# Patient Record
Sex: FEMALE | Race: BLACK OR AFRICAN AMERICAN | NOT HISPANIC OR LATINO | Employment: UNEMPLOYED | URBAN - METROPOLITAN AREA
[De-identification: names, ages, dates, MRNs, and addresses within clinical notes are randomized per-mention and may not be internally consistent; named-entity substitution may affect disease eponyms.]

---

## 2017-02-08 ENCOUNTER — HISTORICAL (OUTPATIENT)
Dept: ANESTHESIOLOGY | Facility: HOSPITAL | Age: 52
End: 2017-02-08

## 2017-10-19 ENCOUNTER — HISTORICAL (OUTPATIENT)
Dept: INTERNAL MEDICINE | Facility: CLINIC | Age: 52
End: 2017-10-19

## 2018-01-09 ENCOUNTER — HISTORICAL (OUTPATIENT)
Dept: WOUND CARE | Facility: HOSPITAL | Age: 53
End: 2018-01-09

## 2018-05-15 ENCOUNTER — HISTORICAL (OUTPATIENT)
Dept: INTERNAL MEDICINE | Facility: CLINIC | Age: 53
End: 2018-05-15

## 2019-01-21 ENCOUNTER — HISTORICAL (OUTPATIENT)
Dept: RADIOLOGY | Facility: HOSPITAL | Age: 54
End: 2019-01-21

## 2019-09-16 ENCOUNTER — HISTORICAL (OUTPATIENT)
Dept: ADMINISTRATIVE | Facility: HOSPITAL | Age: 54
End: 2019-09-16

## 2019-09-16 LAB
ABS NEUT (OLG): 5.11 X10(3)/MCL (ref 2.1–9.2)
ALBUMIN SERPL-MCNC: 4.3 GM/DL (ref 3.4–5)
ALBUMIN/GLOB SERPL: 1.1 RATIO (ref 1.1–2)
ALP SERPL-CCNC: 103 UNIT/L (ref 45–117)
ALT SERPL-CCNC: 20 UNIT/L (ref 12–78)
AST SERPL-CCNC: 13 UNIT/L (ref 15–37)
BASOPHILS # BLD AUTO: 0 X10(3)/MCL (ref 0–0.2)
BASOPHILS NFR BLD AUTO: 0 %
BILIRUB SERPL-MCNC: 0.4 MG/DL (ref 0.2–1)
BILIRUBIN DIRECT+TOT PNL SERPL-MCNC: 0.1 MG/DL (ref 0–0.2)
BILIRUBIN DIRECT+TOT PNL SERPL-MCNC: 0.3 MG/DL
BUN SERPL-MCNC: 10 MG/DL (ref 7–18)
CALCIUM SERPL-MCNC: 9.4 MG/DL (ref 8.5–10.1)
CHLORIDE SERPL-SCNC: 107 MMOL/L (ref 98–107)
CHOLEST SERPL-MCNC: 223 MG/DL
CHOLEST/HDLC SERPL: 3.9 {RATIO} (ref 0–4.4)
CO2 SERPL-SCNC: 31 MMOL/L (ref 21–32)
CREAT SERPL-MCNC: 0.8 MG/DL (ref 0.6–1.3)
EOSINOPHIL # BLD AUTO: 0.2 X10(3)/MCL (ref 0–0.9)
EOSINOPHIL NFR BLD AUTO: 2 %
ERYTHROCYTE [DISTWIDTH] IN BLOOD BY AUTOMATED COUNT: 13.5 % (ref 11.5–14.5)
EST. AVERAGE GLUCOSE BLD GHB EST-MCNC: 108 MG/DL
GLOBULIN SER-MCNC: 3.8 GM/ML (ref 2.3–3.5)
GLUCOSE SERPL-MCNC: 108 MG/DL (ref 74–106)
HBA1C MFR BLD: 5.4 % (ref 4.2–6.3)
HCT VFR BLD AUTO: 39.3 % (ref 35–46)
HDLC SERPL-MCNC: 57 MG/DL (ref 40–59)
HGB BLD-MCNC: 13.7 GM/DL (ref 12–16)
IMM GRANULOCYTES # BLD AUTO: 0.02 10*3/UL
IMM GRANULOCYTES NFR BLD AUTO: 0 %
LDLC SERPL CALC-MCNC: 148 MG/DL
LYMPHOCYTES # BLD AUTO: 2.7 X10(3)/MCL (ref 0.6–4.6)
LYMPHOCYTES NFR BLD AUTO: 31 %
MCH RBC QN AUTO: 28.2 PG (ref 26–34)
MCHC RBC AUTO-ENTMCNC: 34.9 GM/DL (ref 31–37)
MCV RBC AUTO: 81 FL (ref 80–100)
MONOCYTES # BLD AUTO: 0.6 X10(3)/MCL (ref 0.1–1.3)
MONOCYTES NFR BLD AUTO: 7 %
NEUTROPHILS # BLD AUTO: 5.11 X10(3)/MCL (ref 2.1–9.2)
NEUTROPHILS NFR BLD AUTO: 59 %
PLATELET # BLD AUTO: 216 X10(3)/MCL (ref 130–400)
PMV BLD AUTO: 10.7 FL (ref 7.4–10.4)
POTASSIUM SERPL-SCNC: 3.4 MMOL/L (ref 3.5–5.1)
PROT SERPL-MCNC: 8.1 GM/DL (ref 6.4–8.2)
RBC # BLD AUTO: 4.85 X10(6)/MCL (ref 4–5.2)
SODIUM SERPL-SCNC: 142 MMOL/L (ref 136–145)
T4 FREE SERPL-MCNC: 1.18 NG/DL (ref 0.76–1.46)
TRIGL SERPL-MCNC: 88 MG/DL
TSH SERPL-ACNC: 4.91 MIU/L (ref 0.36–3.74)
VLDLC SERPL CALC-MCNC: 18 MG/DL
WBC # SPEC AUTO: 8.7 X10(3)/MCL (ref 4.5–11)

## 2020-01-08 LAB
HPV16+18+H RISK 12 DNA CVX-IMP: NEGATIVE
PAP RECOMMENDATION EXT: NORMAL
PAP SMEAR: NORMAL

## 2020-02-03 ENCOUNTER — HISTORICAL (OUTPATIENT)
Dept: RADIOLOGY | Facility: HOSPITAL | Age: 55
End: 2020-02-03

## 2020-11-17 ENCOUNTER — HISTORICAL (OUTPATIENT)
Dept: ADMINISTRATIVE | Facility: HOSPITAL | Age: 55
End: 2020-11-17

## 2020-11-19 ENCOUNTER — HISTORICAL (OUTPATIENT)
Dept: SURGERY | Facility: HOSPITAL | Age: 55
End: 2020-11-19

## 2020-11-23 ENCOUNTER — HISTORICAL (OUTPATIENT)
Dept: INTERNAL MEDICINE | Facility: CLINIC | Age: 55
End: 2020-11-23

## 2020-11-23 LAB
ABS NEUT (OLG): 5.32 X10(3)/MCL (ref 2.1–9.2)
ALBUMIN SERPL-MCNC: 4.4 GM/DL (ref 3.5–5)
ALBUMIN/GLOB SERPL: 1.3 RATIO (ref 1.1–2)
ALP SERPL-CCNC: 85 UNIT/L (ref 40–150)
ALT SERPL-CCNC: 14 UNIT/L (ref 0–55)
AST SERPL-CCNC: 27 UNIT/L (ref 5–34)
BASOPHILS # BLD AUTO: 0 X10(3)/MCL (ref 0–0.2)
BASOPHILS NFR BLD AUTO: 0 %
BILIRUB SERPL-MCNC: 0.3 MG/DL
BILIRUBIN DIRECT+TOT PNL SERPL-MCNC: 0.1 MG/DL (ref 0–0.5)
BILIRUBIN DIRECT+TOT PNL SERPL-MCNC: 0.2 MG/DL (ref 0–0.8)
BUN SERPL-MCNC: 10 MG/DL (ref 9.8–20.1)
CALCIUM SERPL-MCNC: 9.7 MG/DL (ref 8.4–10.2)
CHLORIDE SERPL-SCNC: 105 MMOL/L (ref 98–107)
CHOLEST SERPL-MCNC: 187 MG/DL
CHOLEST/HDLC SERPL: 4 {RATIO} (ref 0–5)
CO2 SERPL-SCNC: 30 MMOL/L (ref 22–29)
CREAT SERPL-MCNC: 0.73 MG/DL (ref 0.55–1.02)
EOSINOPHIL # BLD AUTO: 0.2 X10(3)/MCL (ref 0–0.9)
EOSINOPHIL NFR BLD AUTO: 2 %
ERYTHROCYTE [DISTWIDTH] IN BLOOD BY AUTOMATED COUNT: 13.9 % (ref 11.5–14.5)
EST. AVERAGE GLUCOSE BLD GHB EST-MCNC: 96.8 MG/DL
GLOBULIN SER-MCNC: 3.3 GM/DL (ref 2.4–3.5)
GLUCOSE SERPL-MCNC: 89 MG/DL (ref 74–100)
HBA1C MFR BLD: 5 %
HCT VFR BLD AUTO: 38.5 % (ref 35–46)
HDLC SERPL-MCNC: 48 MG/DL (ref 35–60)
HGB BLD-MCNC: 13.5 GM/DL (ref 12–16)
IMM GRANULOCYTES # BLD AUTO: 0.02 10*3/UL
IMM GRANULOCYTES NFR BLD AUTO: 0 %
LDLC SERPL CALC-MCNC: 103 MG/DL (ref 50–140)
LYMPHOCYTES # BLD AUTO: 2.9 X10(3)/MCL (ref 0.6–4.6)
LYMPHOCYTES NFR BLD AUTO: 32 %
MCH RBC QN AUTO: 28.7 PG (ref 26–34)
MCHC RBC AUTO-ENTMCNC: 35.1 GM/DL (ref 31–37)
MCV RBC AUTO: 81.7 FL (ref 80–100)
MONOCYTES # BLD AUTO: 0.6 X10(3)/MCL (ref 0.1–1.3)
MONOCYTES NFR BLD AUTO: 7 %
NEUTROPHILS # BLD AUTO: 5.32 X10(3)/MCL (ref 2.1–9.2)
NEUTROPHILS NFR BLD AUTO: 59 %
PLATELET # BLD AUTO: 219 X10(3)/MCL (ref 130–400)
PMV BLD AUTO: 11.3 FL (ref 7.4–10.4)
POTASSIUM SERPL-SCNC: 3.4 MMOL/L (ref 3.5–5.1)
PROT SERPL-MCNC: 7.7 GM/DL (ref 6.4–8.3)
RBC # BLD AUTO: 4.71 X10(6)/MCL (ref 4–5.2)
SODIUM SERPL-SCNC: 145 MMOL/L (ref 136–145)
T4 FREE SERPL-MCNC: 1.03 NG/DL (ref 0.7–1.48)
TRIGL SERPL-MCNC: 180 MG/DL (ref 37–140)
TSH SERPL-ACNC: 5.44 UIU/ML (ref 0.35–4.94)
VLDLC SERPL CALC-MCNC: 36 MG/DL
WBC # SPEC AUTO: 9.1 X10(3)/MCL (ref 4.5–11)

## 2021-03-30 ENCOUNTER — HISTORICAL (OUTPATIENT)
Dept: ADMINISTRATIVE | Facility: HOSPITAL | Age: 56
End: 2021-03-30

## 2021-03-30 LAB — SARS-COV-2 RNA RESP QL NAA+PROBE: NOT DETECTED

## 2021-04-01 ENCOUNTER — HISTORICAL (OUTPATIENT)
Dept: SURGERY | Facility: HOSPITAL | Age: 56
End: 2021-04-01

## 2021-04-06 ENCOUNTER — HISTORICAL (OUTPATIENT)
Dept: RADIOLOGY | Facility: HOSPITAL | Age: 56
End: 2021-04-06

## 2021-08-06 ENCOUNTER — HISTORICAL (OUTPATIENT)
Dept: INTERNAL MEDICINE | Facility: CLINIC | Age: 56
End: 2021-08-06

## 2021-09-01 ENCOUNTER — HISTORICAL (OUTPATIENT)
Dept: RADIOLOGY | Facility: HOSPITAL | Age: 56
End: 2021-09-01

## 2022-03-24 ENCOUNTER — HISTORICAL (OUTPATIENT)
Dept: INTERNAL MEDICINE | Facility: CLINIC | Age: 57
End: 2022-03-24

## 2022-03-24 LAB
ABS NEUT (OLG): 5.16 (ref 2.1–9.2)
ALBUMIN SERPL-MCNC: 4.3 G/DL (ref 3.5–5)
ALBUMIN/GLOB SERPL: 1.2 {RATIO} (ref 1.1–2)
ALP SERPL-CCNC: 71 U/L (ref 40–150)
ALT SERPL-CCNC: 17 U/L (ref 0–55)
AST SERPL-CCNC: 18 U/L (ref 5–34)
BASOPHILS # BLD AUTO: 0 10*3/UL (ref 0–0.2)
BASOPHILS NFR BLD AUTO: 0 %
BILIRUB SERPL-MCNC: 0.3 MG/DL
BILIRUBIN DIRECT+TOT PNL SERPL-MCNC: 0.1 (ref 0–0.5)
BILIRUBIN DIRECT+TOT PNL SERPL-MCNC: 0.2 (ref 0–0.8)
BUN SERPL-MCNC: 12.3 MG/DL (ref 9.8–20.1)
CALCIUM SERPL-MCNC: 10.3 MG/DL (ref 8.7–10.5)
CHLORIDE SERPL-SCNC: 103 MMOL/L (ref 98–107)
CHOLEST SERPL-MCNC: 214 MG/DL
CHOLEST/HDLC SERPL: 4 {RATIO} (ref 0–5)
CO2 SERPL-SCNC: 31 MMOL/L (ref 22–29)
CREAT SERPL-MCNC: 0.71 MG/DL (ref 0.55–1.02)
EOSINOPHIL # BLD AUTO: 0.3 10*3/UL (ref 0–0.9)
EOSINOPHIL NFR BLD AUTO: 3 %
ERYTHROCYTE [DISTWIDTH] IN BLOOD BY AUTOMATED COUNT: 14.2 % (ref 11.5–14.5)
EST. AVERAGE GLUCOSE BLD GHB EST-MCNC: 105.4 MG/DL
FLAG2 (OHS): 60
FLAG3 (OHS): 90
FLAGS (OHS): 90
GLOBULIN SER-MCNC: 3.5 G/DL (ref 2.4–3.5)
GLUCOSE SERPL-MCNC: 95 MG/DL (ref 74–100)
HBA1C MFR BLD: 5.3 %
HCT VFR BLD AUTO: 40 % (ref 35–46)
HDLC SERPL-MCNC: 50 MG/DL (ref 35–60)
HEMOLYSIS INTERF INDEX SERPL-ACNC: 3
HGB BLD-MCNC: 14.5 G/DL (ref 12–16)
ICTERIC INTERF INDEX SERPL-ACNC: 0
IMM GRANULOCYTES # BLD AUTO: 0.02 10*3/UL
IMM GRANULOCYTES NFR BLD AUTO: 0 %
IMM. NE 2 SUSPECT FLAG (OHS): 10
LDLC SERPL CALC-MCNC: 142 MG/DL (ref 50–140)
LIPEMIC INTERF INDEX SERPL-ACNC: 6
LOW EVENT # SUSPECT FLAG (OHS): 80
LYMPHOCYTES # BLD AUTO: 3.1 10*3/UL (ref 0.6–4.6)
LYMPHOCYTES NFR BLD AUTO: 34 %
MANUAL DIFF? (OHS): NO
MCH RBC QN AUTO: 28.7 PG (ref 26–34)
MCHC RBC AUTO-ENTMCNC: 36.3 G/DL (ref 31–37)
MCV RBC AUTO: 79.2 FL (ref 80–100)
MO BLASTS SUSPECT FLAG (OHS): 40
MONOCYTES # BLD AUTO: 0.7 10*3/UL (ref 0.1–1.3)
MONOCYTES NFR BLD AUTO: 7 %
NEUTROPHILS # BLD AUTO: 5.16 10*3/UL (ref 2.1–9.2)
NEUTROPHILS NFR BLD AUTO: 56 %
NRBC BLD AUTO-RTO: 0 % (ref 0–0.2)
PLATELET # BLD AUTO: 224 10*3/UL (ref 130–400)
PLATELET CLUMPS SUSPECT FLAG (OHS): 70
PMV BLD AUTO: 10.8 FL (ref 7.4–10.4)
POTASSIUM SERPL-SCNC: 3.8 MMOL/L (ref 3.5–5.1)
PROT SERPL-MCNC: 7.8 G/DL (ref 6.4–8.3)
RBC # BLD AUTO: 5.05 10*6/UL (ref 4–5.2)
SODIUM SERPL-SCNC: 141 MMOL/L (ref 136–145)
T4 SERPL-MCNC: 8.85 UG/DL (ref 4.87–11.72)
TRIGL SERPL-MCNC: 112 MG/DL (ref 37–140)
TSH SERPL-ACNC: 2.46 M[IU]/L (ref 0.35–4.94)
VLDLC SERPL CALC-MCNC: 22 MG/DL
WBC # SPEC AUTO: 9.3 10*3/UL (ref 4.5–11)

## 2022-04-08 ENCOUNTER — HISTORICAL (OUTPATIENT)
Dept: ADMINISTRATIVE | Facility: HOSPITAL | Age: 57
End: 2022-04-08

## 2022-04-08 ENCOUNTER — HISTORICAL (OUTPATIENT)
Dept: RADIOLOGY | Facility: HOSPITAL | Age: 57
End: 2022-04-08

## 2022-04-09 ENCOUNTER — HISTORICAL (OUTPATIENT)
Dept: ADMINISTRATIVE | Facility: HOSPITAL | Age: 57
End: 2022-04-09
Payer: MEDICAID

## 2022-04-26 VITALS
HEIGHT: 63 IN | DIASTOLIC BLOOD PRESSURE: 68 MMHG | SYSTOLIC BLOOD PRESSURE: 102 MMHG | WEIGHT: 199.5 LBS | OXYGEN SATURATION: 100 % | BODY MASS INDEX: 35.35 KG/M2

## 2022-04-30 NOTE — OP NOTE
Patient:   Keri Sahni            MRN: [id number]            FIN: [id number]               Age:   55 years     Sex:  Female     :  1965   Associated Diagnoses:   None   Author:   Justin Spencer MD      Operative Note   OPERATIVE NOTE -- OPHTHALMOLOGY SERVICE  SURGEON: James Spencer MD  ATTENDING: Rian Engle MD  PRE-OPERATIVE DIAGNOSIS: Visually significant cataract, left eye  POST-OPERATIVE DIAGNOSIS: pseudophakia, left eye  PROCEDURES: Phacoemulsification with intraocular lens, left eye  IMPLANT: [id number] +20.5 D  ANESTHESIA: MAC with topical/intracameral lidocaine  COMPLICATIONS: None  ESTIMATED BLOOD LOSS: < 5 cc  INDICATION:  The patient has a history of painless progressive visual loss and difficulty with activities of daily living secondary to cataract formation. After a thorough discussion of the risks, benefits and alternatives to cataract surgery, including, but not limited to, the rare risks of infection, retinal detachment, need for additional surgery, loss of vision and even loss of the eye, the patient voices good understanding and desires to proceed.  Written informed consent was confirmed and witnessed prior to surgery.  PROCEDURE IN DETAIL:  The patients IOL calculations and lens selection were reviewed and confirmed. After verification and marking of the proper eye in the preop holding area, several sets of 1% tropicamide, 2.5% phenylephrine, and 1% cyclopentolate drops were then placed.  The patient was brought to the operating room in supine position where the eye was prepped and draped in standard sterile fashion using 5% betadine.  A lid speculum placed. Topical tetracaine was used in addition to the preoperative anesthesia. A paracentesis incision was created through which viscoelastic was used to fill the anterior chamber. A 2.4mm keratome blade was used to create a triplanar temporal clear corneal incision. A cystotome and Utrata forceps was then used to fashion a  continuous curvilinear capsulorrhexis. Hydrodissection and hydrodelineation with BSS was performed using a hydrodissection cannula. Phacoemulsification of the nucleus was carried out with a divide and conquer technique, and all remaining epinuclear and cortical material was removed. The eye was reformed using viscoelastic and the above listed intraocular lens was implanted into the capsular bag. All remaining viscoelastic was removed from the eye. At the end of the case, the lens was well-centered and all wounds were found to be watertight.  Drops of Vigamox and Pred Forte were instilled and an eye shield placed over the eye. The patient tolerated the procedure well and was taken to the recovery area in stable condition. The patient was instructed to follow-up for routine post-operative care the following morning.  The attending surgeon was present, supervising, and assisting as necessary during the entire surgery.

## 2022-05-02 NOTE — HISTORICAL [facility] CERNER
This is a historical note converted from Belinda. Formatting and pictures may have been removed.  Please reference Belinda for original formatting and attached multimedia. Chief Complaint  Pre op clearance. Having neck surgery.  History of Present Illness  2 yo bf with sinus, to havec spine surgery, depression, htn  Review of Systems  neg cv, neg pulm, neg gi  Physical Exam  Vitals & Measurements  T:?37? ?C (Oral)? HR:?83(Peripheral)? RR:?18? BP:?142/87?  HT:?152?cm? WT:?88.3?kg? BMI:?38.22?  aax4 nad  yulisa eomi  cv rrr s1s2 no mgr  lungs cta no cr or whz  abd nt soft  ext no edema  neuro intact  Assessment/Plan  1.?HTN (hypertension)?I10  ?controlled  Ordered:  CBC w/ Auto Diff, Routine collect, *Est. 09/16/19 3:00:00 CDT, Blood, Order for future visit, *Est. Stop date 09/16/19 3:00:00 CDT, Lab Collect, HTN (hypertension)  Obesity  Sinusitis, 09/16/19 13:19:00 CDT  Clinic Follow up, *Est. 03/16/20 3:00:00 CDT, Order for future visit, HTN (hypertension)  Obesity  Sinusitis, [facility]  Comprehensive Metabolic Panel, Routine collect, *Est. 09/16/19 3:00:00 CDT, Blood, Order for future visit, *Est. Stop date 09/16/19 3:00:00 CDT, Lab Collect, HTN (hypertension)  Obesity  Sinusitis, 09/16/19 13:19:00 CDT  Electrocardiogram Adult 12 Lead, 09/16/19 13:19:00 CDT, Routine, 09/16/19 13:19:00 CDT, Ambulatory, Orders for Future Visit, -1, -1, 09/16/19 13:19:00 CDT, HTN (hypertension)  Obesity  Sinusitis  Hemoglobin A1C [facility], Routine collect, *Est. 09/16/19 3:00:00 CDT, Blood, Order for future visit, *Est. Stop date 09/16/19 3:00:00 CDT, Lab Collect, HTN (hypertension)  Obesity  Sinusitis, 09/16/19 13:19:00 CDT  Lipid Panel, Routine collect, *Est. 09/16/19 3:00:00 CDT, Blood, Order for future visit, *Est. Stop date 09/16/19 3:00:00 CDT, Lab Collect, HTN (hypertension)  Obesity  Sinusitis, 09/16/19 13:19:00 CDT  Office/Outpatient Visit Level 4 Established 83916 PC, HTN (hypertension)  Obesity  Sinusitis, [facility], 09/16/19 13:20:00 CDT  Thyroid Stimulating Hormone, Routine collect, *Est. 09/16/19 3:00:00 CDT, Blood, Order for future visit, *Est. Stop date 09/16/19 3:00:00 CDT, Lab Collect, HTN (hypertension)  Obesity  Sinusitis, 09/16/19 13:19:00 CDT  XR Chest 2 Views, Routine, *Est. 09/16/19 3:00:00 CDT, Other (please specify), None, Ambulatory, preop, Rad Type, Order for future visit, HTN (hypertension)  Obesity  Sinusitis  Preop examination, Not Scheduled, *Est. 09/16/19 3:00:00 CDT  ?  2.?Obesity?E66.9  ?dash  Ordered:  CBC w/ Auto Diff, Routine collect, *Est. 09/16/19 3:00:00 CDT, Blood, Order for future visit, *Est. Stop date 09/16/19 3:00:00 CDT, Lab Collect, HTN (hypertension)  Obesity  Sinusitis, 09/16/19 13:19:00 CDT  Clinic Follow up, *Est. 03/16/20 3:00:00 CDT, Order for future visit, HTN (hypertension)  Obesity  Sinusitis, [facility]  Comprehensive Metabolic Panel, Routine collect, *Est. 09/16/19 3:00:00 CDT, Blood, Order for future visit, *Est. Stop date 09/16/19 3:00:00 CDT, Lab Collect, HTN (hypertension)  Obesity  Sinusitis, 09/16/19 13:19:00 CDT  Electrocardiogram Adult 12 Lead, 09/16/19 13:19:00 CDT, Routine, 09/16/19 13:19:00 CDT, Ambulatory, Orders for Future Visit, -1, -1, 09/16/19 13:19:00 CDT, HTN (hypertension)  Obesity  Sinusitis  Hemoglobin A1C [facility], Routine collect, *Est. 09/16/19 3:00:00 CDT, Blood, Order for future visit, *Est. Stop date 09/16/19 3:00:00 CDT, Lab Collect, HTN (hypertension)  Obesity  Sinusitis, 09/16/19 13:19:00 CDT  Lipid Panel, Routine collect, *Est. 09/16/19 3:00:00 CDT, Blood, Order for future visit, *Est. Stop date 09/16/19 3:00:00 CDT, Lab Collect, HTN (hypertension)  Obesity  Sinusitis, 09/16/19 13:19:00 CDT  Office/Outpatient Visit Level 4 Established 31365 PC, HTN (hypertension)  Obesity  Sinusitis, [facility] Int [facility] C, 09/16/19 13:20:00 CDT  Thyroid Stimulating Hormone, Routine collect, *Est. 09/16/19 3:00:00 CDT, Blood, Order for future visit,  *Est. Stop date 09/16/19 3:00:00 CDT, Lab Collect, HTN (hypertension)  Obesity  Sinusitis, 09/16/19 13:19:00 CDT  XR Chest 2 Views, Routine, *Est. 09/16/19 3:00:00 CDT, Other (please specify), None, Ambulatory, preop, Rad Type, Order for future visit, HTN (hypertension)  Obesity  Sinusitis  Preop examination, Not Scheduled, *Est. 09/16/19 3:00:00 CDT  ?  3.?Sinusitis?J32.9  ?stable  Ordered:  CBC w/ Auto Diff, Routine collect, *Est. 09/16/19 3:00:00 CDT, Blood, Order for future visit, *Est. Stop date 09/16/19 3:00:00 CDT, Lab Collect, HTN (hypertension)  Obesity  Sinusitis, 09/16/19 13:19:00 CDT  Clinic Follow up, *Est. 03/16/20 3:00:00 CDT, Order for future visit, HTN (hypertension)  Obesity  Sinusitis, [facility]  Comprehensive Metabolic Panel, Routine collect, *Est. 09/16/19 3:00:00 CDT, Blood, Order for future visit, *Est. Stop date 09/16/19 3:00:00 CDT, Lab Collect, HTN (hypertension)  Obesity  Sinusitis, 09/16/19 13:19:00 CDT  Electrocardiogram Adult 12 Lead, 09/16/19 13:19:00 CDT, Routine, 09/16/19 13:19:00 CDT, Ambulatory, Orders for Future Visit, -1, -1, 09/16/19 13:19:00 CDT, HTN (hypertension)  Obesity  Sinusitis  Hemoglobin A1C [facility], Routine collect, *Est. 09/16/19 3:00:00 CDT, Blood, Order for future visit, *Est. Stop date 09/16/19 3:00:00 CDT, Lab Collect, HTN (hypertension)  Obesity  Sinusitis, 09/16/19 13:19:00 CDT  Lipid Panel, Routine collect, *Est. 09/16/19 3:00:00 CDT, Blood, Order for future visit, *Est. Stop date 09/16/19 3:00:00 CDT, Lab Collect, HTN (hypertension)  Obesity  Sinusitis, 09/16/19 13:19:00 CDT  Office/Outpatient Visit Level 4 Established 75015 PC, HTN (hypertension)  Obesity  Sinusitis, [facility], 09/16/19 13:20:00 CDT  Thyroid Stimulating Hormone, Routine collect, *Est. 09/16/19 3:00:00 CDT, Blood, Order for future visit, *Est. Stop date 09/16/19 3:00:00 CDT, Lab Collect, HTN (hypertension)  Obesity  Sinusitis, 09/16/19 13:19:00 CDT  XR Chest 2 Views,  Routine, *Est. 09/16/19 3:00:00 CDT, Other (please specify), None, Ambulatory, preop, Rad Type, Order for future visit, HTN (hypertension)  Obesity  Sinusitis  Preop examination, Not Scheduled, *Est. 09/16/19 3:00:00 CDT  ?  Referrals  Clinic Follow up, *Est. 03/16/20 3:00:00 CDT, Order for future visit, HTN (hypertension)  Obesity  Sinusitis, [facility]   Problem List/Past Medical History  Ongoing  Back injury  Benign essential HTN  Depression  HTN (hypertension)  Insomnia  Knee pain  Obesity  Sinusitis  Historical  Tobacco user  Procedure/Surgical History  Appendectomy  Hysterectomy  Knee  NECK SURG  Spur  Tonsillectomy   Medications  busPIRone 5 mg oral tablet, 5 mg= 1 tab(s), Oral, BID  Celebrex 50 mg oral capsule, 50 mg= 1 cap(s), Oral, BID,? ?Not taking  celecoxib 200 mg oral capsule, 200 mg= 1 cap(s), Oral, Daily  Cymbalta 60 mg oral delayed release capsule, 60 mg= 1 cap(s), Oral, BID  doxepin 5% topical cream, 1 arti, TOP, QID  EYE ITCH REL JULIA 0.025%OP,? ?Not Taking, Completed Rx: Last Dose Date/Time Unknown  fluticasone 50 mcg/inh nasal spray, 1 spray(s), Nasal, Daily  gabapentin 300 mg oral capsule, 300 mg= 1 cap(s), Oral, BID  ketoconazole 2% topical cream, 1 arti, TOP, Daily  loratadine 10 mg oral tablet, 10 mg= 1 tab(s), Oral, Daily  losartan 50 mg oral tablet, 50 mg= 1 tab(s), Oral, Daily, 11 refills  Multi Vitamin+  Norco 10 mg-325 mg oral tablet, Oral, q6hr  ProAir HFA 90 mcg/inh inhalation aerosol with adapter, See Instructions, 11 refills  traZODone, Oral  Allergies  No Known Allergies  Social History  Abuse/Neglect  No, No, Yes, 09/16/2019  No, 06/25/2019  Alcohol - Denies Alcohol Use, 11/22/2014  Never, 07/31/2016  Employment/School  disability, Highest education level: Some college. Operates hazardous equipment: No., 09/07/2016  Exercise  Exercise frequency: 1-2 times/week. Exercise type: Walking., 03/27/2019  Home/Environment  Lives with Alone. Living situation: Home/Independent. Alcohol  abuse in household: No. Substance abuse in household: No. Smoker in household: No. Injuries/Abuse/Neglect in household: No. Feels unsafe at home: No. Safe place to go: Yes. Agency(s)/Others notified: No. Family/Friends available for support: Yes. Concern for family members at home: No. Major illness in household: No. Financial concerns: No. TV/Computer concerns: No., 09/07/2016  Nutrition/Health  low sodium, low sugar, low carb, Wants to lose weight: Yes. Sleeping concerns: Yes. Feels highly stressed: Yes., 03/27/2019  Sexual  Gender Identity Identifies as female., 03/27/2019  Substance Use - Denies Substance Abuse, 11/22/2014  Never, 07/31/2016  Tobacco - Denies Tobacco Use, 11/22/2014  Former smoker, quit more than 30 days ago, N/A, 09/16/2019  4 or less cigarettes(less than 1/4 pack)/day in last 30 days, No, 06/25/2019  4 or less cigarettes(less than 1/4 pack)/day in last 30 days, Cigarettes, Yes, 05/13/2019  4 or less cigarettes(less than 1/4 pack)/day in last 30 days, Cigarettes, No, Ready to change: Yes. Previous treatment: None., 03/27/2019  Family History  Diabetes mellitus type 1.: Mother and Father.  Hypertension.: Mother and Father.  Immunizations  Vaccine Date Status   hepatitis B adult vaccine 08/14/2013 Recorded   hepatitis B adult vaccine 07/11/2013 Recorded   Health Maintenance  Health Maintenance  ???Pending?(in the next year)  ??? ??OverDue  ??? ? ? ?Diabetes Screening due??and every?  ??? ? ? ?Hypertension Management-BMP due??08/25/18??and every 1??year(s)  ??? ? ? ?Colorectal Screening due??10/16/18??and every 1??year(s)  ??? ? ? ?Alcohol Misuse Screening due??01/01/19??and every 1??year(s)  ??? ??Due?  ??? ? ? ?Aspirin Therapy for CVD Prevention due??09/16/19??and every 1??year(s)  ??? ? ? ?Hypertension Management-Education due??09/16/19??and every 1??year(s)  ??? ? ? ?Influenza Vaccine due??09/16/19??and every?  ??? ? ? ?Tetanus Vaccine due??09/16/19??and every 10??year(s)  ??? ??Due In  Future?  ??? ? ? ?Obesity Screening not due until??01/01/20??and every 1??year(s)  ??? ? ? ?Blood Pressure Screening not due until??09/15/20??and every 1??year(s)  ??? ? ? ?Body Mass Index Check not due until??09/15/20??and every 1??year(s)  ??? ? ? ?Hypertension Management-Blood Pressure not due until??09/15/20??and every 1??year(s)  ???Satisfied?(in the past 1 year)  ??? ??Satisfied?  ??? ? ? ?ADL Screening on??09/16/19.??Satisfied by Scarlett Lepe LPN  ??? ? ? ?Blood Pressure Screening on??09/16/19.??Satisfied by Scarlett Lepe LPN  ??? ? ? ?Body Mass Index Check on??09/16/19.??Satisfied by Scarlett Lepe LPN  ??? ? ? ?Breast Cancer Screening on??01/21/19.??Satisfied by Maddy Hernandez  ??? ? ? ?Depression Screening on??09/16/19.??Satisfied by Scarlett Lepe LPN  ??? ? ? ?Hypertension Management-Blood Pressure on??09/16/19.??Satisfied by Scarlett Lepe LPN  ??? ? ? ?Influenza Vaccine on??11/20/18.??Satisfied by Kourtney Hess LPN  ??? ? ? ?Obesity Screening on??09/16/19.??Satisfied by Scarlett Lepe LPN  ?      lab, ekg cxr reviewed cleared for surgery

## 2022-05-02 NOTE — HISTORICAL [facility] CERNER
This is a historical note converted from Belinda. Formatting and pictures may have been removed.  Please reference Belinda for original formatting and attached multimedia. ?  OPERATIVE NOTE -- OPHTHALMOLOGY SERVICE  ?   DATE: 11/19/2020  ?   SURGEON:?Irwin Devlin?MD  ?   ATTENDING:?EVELINE Engle MD  ?  PRE-OPERATIVE DIAGNOSIS: Visually significant cataract,?right eye  ?  POST-OPERATIVE DIAGNOSIS: Visually significant cataract,?right eye  ?  PROCEDURES: Phacoemulsification with intraocular lens,?right eye  ?  IMPLANT: Toric: [id number] +210 at 177 degree meridien  ?  ANESTHESIA: MAC  ?  COMPLICATIONS: None  ?  ESTIMATED BLOOD LOSS: < 5 cc  ?  INDICATION:  ?  The patient has a history of painless progressive visual loss and difficulty with activities of daily living secondary to cataract formation. After a thorough discussion of the risks, benefits and alternatives to cataract surgery, including, but not limited to, the rare risks of infection, retinal detachment, need for additional surgery, loss of vision and even loss of the eye, the patient voices good understanding and desires to proceed. Written informed consent was confirmed and witnessed prior to surgery.  ?  PROCEDURE IN DETAIL:  ?  The patient?s IOL calculations and lens selection were reviewed and confirmed. After verification and marking of the proper eye in the preop holding area, several sets of 1% tropicamide and 2.5% phenylephrine drops were then placed.  ?  The patient was brought to the operating room in supine position where the eye was prepped and draped in standard sterile fashion using 5% betadine. A lid speculum placed. Topical tetracaine was used in addition to the preoperative anesthesia. A paracentesis incision was created through which lidocaine followed by viscoelastic was used to fill the anterior chamber. A 2.5mm keratome blade was used to create a triplanar temporal clear corneal incision. A cystotome and Utrata forceps was then used to fashion a  continuous curvilinear capsulorrhexis. Hydrodissection with BSS was performed using a?Moreno hydrodissection cannula. Phacoemulsification of the nucleus was carried out with a divide and conquer technique, and all remaining epinuclear and cortical material was removed. The eye was reformed using viscoelastic. The above listed intraocular lens was implanted into the capsular bag successfully and dialled into good position.?All remaining viscoelastic was removed from the eye. At the end of the case, the lens was well-centered and all wounds were found to be watertight.  ?  Drops of Vigamox and Pred Forte were instilled and an eye shield placed over the eye. The patient tolerated the procedure well and was taken to the recovery area in stable condition. The patient was instructed to follow-up for routine post-operative care the following morning.  ?  The attending surgeon was present, supervising, and assisting as necessary during the entire surgery.

## 2022-05-02 NOTE — HISTORICAL [facility] CERNER
This is a historical note converted from Belinda. Formatting and pictures may have been removed.  Please reference Belinda for original formatting and attached multimedia. ?  HPI: PT here for?CEIOL OD. Denies changes in health or medications since the patient was last seen in clinic.  ?   ROS: Negative x 10  ?   SLE:  Ext: wnl OU  L/L: wnl OU  C/S: white and quiet OU  K: clear OU  AC: deep and quiet OU  I: round and reactive OU  L: cataract OD  ?   General NAP  HENT atraumatic  Eyes: cataract OD  Neck: nontender, no masses or thyromegaly  Respiratory: no distress on room air  Cardiovascular: regular rate  Gastrointestinal: no hepatomegaly  Lymphatics: no lymphadenopathy  Musculoskeletal: wnl  ?  Assessment/Plan  1. Visually significant cataracts OD  - Pt complains of decreased vision  - Calcs obtained previously, review calcs before selecting lens  - After extensive discussion of R/B/A, informed consent was signed in clinic and reviewed today  - Plan for CE/IOL OD today with toric lens

## 2022-07-21 ENCOUNTER — TELEPHONE (OUTPATIENT)
Dept: INTERNAL MEDICINE | Facility: CLINIC | Age: 57
End: 2022-07-21

## 2022-07-21 NOTE — TELEPHONE ENCOUNTER
Pt would like to schedule an appointment. Pt states that she seen Dr. Aguilar at the beginning of the year and at the time they could not schedule her future appointment. She states that she needs the appointment in order to get her medications. Pt prefers a Thursday or Friday afternoons.

## 2022-11-29 ENCOUNTER — OFFICE VISIT (OUTPATIENT)
Dept: URGENT CARE | Facility: CLINIC | Age: 57
End: 2022-11-29
Payer: MEDICAID

## 2022-11-29 ENCOUNTER — HOSPITAL ENCOUNTER (OUTPATIENT)
Dept: RADIOLOGY | Facility: HOSPITAL | Age: 57
Discharge: HOME | End: 2022-11-29
Payer: MEDICAID

## 2022-11-29 VITALS
HEIGHT: 60 IN | WEIGHT: 195.38 LBS | BODY MASS INDEX: 38.36 KG/M2 | HEART RATE: 92 BPM | TEMPERATURE: 99 F | RESPIRATION RATE: 18 BRPM | SYSTOLIC BLOOD PRESSURE: 118 MMHG | OXYGEN SATURATION: 94 % | DIASTOLIC BLOOD PRESSURE: 79 MMHG

## 2022-11-29 DIAGNOSIS — Z11.52 ENCOUNTER FOR SCREENING FOR COVID-19: ICD-10-CM

## 2022-11-29 DIAGNOSIS — J18.9 PNEUMONIA OF LEFT LOWER LOBE DUE TO INFECTIOUS ORGANISM: ICD-10-CM

## 2022-11-29 DIAGNOSIS — R68.89 FLU-LIKE SYMPTOMS: ICD-10-CM

## 2022-11-29 DIAGNOSIS — R05.9 COUGH, UNSPECIFIED TYPE: Primary | ICD-10-CM

## 2022-11-29 DIAGNOSIS — Z11.52 ENCOUNTER FOR SCREENING FOR SEVERE ACUTE RESPIRATORY SYNDROME CORONAVIRUS 2 (SARS-COV-2) INFECTION: ICD-10-CM

## 2022-11-29 LAB
CTP QC/QA: YES
CTP QC/QA: YES
FLUAV AG NPH QL: NEGATIVE
FLUAV AG UPPER RESP QL IA.RAPID: NOT DETECTED
FLUBV AG NPH QL: NEGATIVE
FLUBV AG UPPER RESP QL IA.RAPID: NOT DETECTED
RSV A 5' UTR RNA NPH QL NAA+PROBE: NOT DETECTED
SARS-COV-2 RDRP RESP QL NAA+PROBE: NEGATIVE
SARS-COV-2 RNA RESP QL NAA+PROBE: NOT DETECTED

## 2022-11-29 PROCEDURE — 99213 PR OFFICE/OUTPT VISIT, EST, LEVL III, 20-29 MIN: ICD-10-PCS | Mod: S$PBB,,,

## 2022-11-29 PROCEDURE — 99213 OFFICE O/P EST LOW 20 MIN: CPT | Mod: S$PBB,,,

## 2022-11-29 PROCEDURE — 87635 SARS-COV-2 COVID-19 AMP PRB: CPT | Mod: PBBFAC

## 2022-11-29 PROCEDURE — 87804 INFLUENZA ASSAY W/OPTIC: CPT | Mod: 59,PBBFAC

## 2022-11-29 PROCEDURE — 71046 X-RAY EXAM CHEST 2 VIEWS: CPT | Mod: TC

## 2022-11-29 PROCEDURE — 0241U COVID/RSV/FLU A&B PCR: CPT

## 2022-11-29 PROCEDURE — 99215 OFFICE O/P EST HI 40 MIN: CPT | Mod: PBBFAC,25

## 2022-11-29 RX ORDER — BENZONATATE 200 MG/1
200 CAPSULE ORAL 3 TIMES DAILY PRN
Qty: 30 CAPSULE | Refills: 0 | Status: SHIPPED | OUTPATIENT
Start: 2022-11-29 | End: 2022-12-09

## 2022-11-29 RX ORDER — PROMETHAZINE HYDROCHLORIDE AND DEXTROMETHORPHAN HYDROBROMIDE 6.25; 15 MG/5ML; MG/5ML
5 SYRUP ORAL EVERY 6 HOURS PRN
Qty: 118 ML | Refills: 0 | Status: SHIPPED | OUTPATIENT
Start: 2022-11-29 | End: 2022-12-09

## 2022-11-29 RX ORDER — AZITHROMYCIN 250 MG/1
TABLET, FILM COATED ORAL
Qty: 6 TABLET | Refills: 0 | Status: SHIPPED | OUTPATIENT
Start: 2022-11-29 | End: 2022-12-04

## 2022-11-29 NOTE — PROGRESS NOTES
Subjective:       Patient ID: Keri Sahni is a 57 y.o. female.    Vitals:  height is 5' (1.524 m) and weight is 88.6 kg (195 lb 6.4 oz). Her oral temperature is 98.9 °F (37.2 °C). Her blood pressure is 118/79 and her pulse is 92. Her respiration is 18 and oxygen saturation is 94% (abnormal).     Chief Complaint: Nasal Congestion (xSunday), Cough (With chest congestion. xSunday), and Headache (xSunday)    PT states nasal congestion, cough, headache, and abnormal breath sounds since Sunday. Denies SOB or fever. Denies sick contacts.    Cough  Associated symptoms include headaches.   Headache   Associated symptoms include coughing.     Constitution: Negative.   HENT:  Positive for congestion.    Neck: neck negative.   Cardiovascular: Negative.    Eyes: Negative.    Respiratory:  Positive for cough.    Gastrointestinal: Negative.    Genitourinary: Negative.    Musculoskeletal: Negative.    Skin: Negative.    Neurological:  Positive for headaches.     Objective:      Physical Exam   Constitutional: She is oriented to person, place, and time. normal  HENT:   Head: Normocephalic.   Ears:   Right Ear: Tympanic membrane, external ear and ear canal normal.   Left Ear: Tympanic membrane, external ear and ear canal normal.   Nose: Nose normal.   Mouth/Throat: Uvula is midline, oropharynx is clear and moist and mucous membranes are normal. Mucous membranes are moist. Oropharynx is clear.   Eyes: Pupils are equal, round, and reactive to light.   Neck: Neck supple.   Cardiovascular: Normal rate, regular rhythm, normal heart sounds and normal pulses.   Pulmonary/Chest: Effort normal. She has rhonchi.   Abdominal: Normal appearance. Soft.   Musculoskeletal: Normal range of motion.         General: Normal range of motion.   Neurological: She is alert and oriented to person, place, and time.   Skin: Skin is warm and dry.   Vitals reviewed.      Results for orders placed or performed in visit on 11/29/22   POCT COVID-19 Rapid  Screening   Result Value Ref Range    POC Rapid COVID Negative Negative     Acceptable Yes    POCT Influenza A/B   Result Value Ref Range    Rapid Influenza A Ag Negative Negative    Rapid Influenza B Ag Negative Negative     Acceptable Yes      EXAMINATION:  XR CHEST PA AND LATERAL     CLINICAL HISTORY:  Cough, unspecified     TECHNIQUE:  Two views     COMPARISON:  September 16, 2019     FINDINGS:  Cardiopericardial silhouette is within normal limits.  There are subtle patchy opacities which involve the left lower lung zone.  Lungs otherwise are adequately expanded and clear.  No fluid within the pleural spaces.  Previous ACDF.     Impression:     Subtle left lower lung zone opacities may represent atelectasis and/or minimal early infiltrates.  Assessment:       1. Cough, unspecified type    2. Encounter for screening for severe acute respiratory syndrome coronavirus 2 (SARS-CoV-2) infection    3. Flu-like symptoms    4. Pneumonia of left lower lobe due to infectious organism    5. Encounter for screening for COVID-19            Plan:         Cough, unspecified type  -     Cancel: XR CHEST PA AND LATERAL; Future; Expected date: 11/29/2022  -     XR CHEST PA AND LATERAL  -     promethazine-dextromethorphan (PROMETHAZINE-DM) 6.25-15 mg/5 mL Syrp; Take 5 mLs by mouth every 6 (six) hours as needed.  Dispense: 118 mL; Refill: 0  -     benzonatate (TESSALON) 200 MG capsule; Take 1 capsule (200 mg total) by mouth 3 (three) times daily as needed.  Dispense: 30 capsule; Refill: 0    Encounter for screening for severe acute respiratory syndrome coronavirus 2 (SARS-CoV-2) infection  -     POCT COVID-19 Rapid Screening    Flu-like symptoms  -     POCT Influenza A/B    Pneumonia of left lower lobe due to infectious organism  -     azithromycin (Z-AAYUSH) 250 MG tablet; Take 2 tablets by mouth on day 1; Take 1 tablet by mouth on days 2-5  Dispense: 6 tablet; Refill: 0  -     COVID/RSV/FLU A&B  PCR    Encounter for screening for COVID-19  -     COVID/RSV/FLU A&B PCR    Please drink plenty of fluids.  Please get plenty of rest.  Please return here or go to the Emergency Department for any concerns or worsening of condition.      Take over the counter Tylenol (Acetaminophen) and/or Motrin (Ibuprofen) as directed for control of pain and/or fever.  Please follow up with your primary care doctor.     ER precautions given, pt verbalized understanding.     Please see provided patient education for guidance.

## 2022-12-01 ENCOUNTER — TELEPHONE (OUTPATIENT)
Dept: URGENT CARE | Facility: CLINIC | Age: 57
End: 2022-12-01
Payer: MEDICAID

## 2022-12-01 NOTE — TELEPHONE ENCOUNTER
----- Message from SANDRA Davies sent at 11/30/2022 11:44 AM CST -----  Patient portal not activated.  Negative covid/flu/rsv.

## 2022-12-12 RX ORDER — FLUTICASONE PROPIONATE 50 MCG
1 SPRAY, SUSPENSION (ML) NASAL DAILY
Qty: 11.1 ML | Refills: 1 | Status: SHIPPED | OUTPATIENT
Start: 2022-12-12 | End: 2023-02-17 | Stop reason: SDUPTHER

## 2023-01-17 ENCOUNTER — OFFICE VISIT (OUTPATIENT)
Dept: INTERNAL MEDICINE | Facility: CLINIC | Age: 58
End: 2023-01-17
Payer: MEDICAID

## 2023-01-17 VITALS
SYSTOLIC BLOOD PRESSURE: 127 MMHG | TEMPERATURE: 98 F | HEART RATE: 81 BPM | BODY MASS INDEX: 39 KG/M2 | WEIGHT: 198.63 LBS | RESPIRATION RATE: 18 BRPM | HEIGHT: 60 IN | DIASTOLIC BLOOD PRESSURE: 80 MMHG

## 2023-01-17 DIAGNOSIS — Z12.11 COLON CANCER SCREENING: Primary | ICD-10-CM

## 2023-01-17 DIAGNOSIS — I10 HYPERTENSION, UNSPECIFIED TYPE: ICD-10-CM

## 2023-01-17 PROCEDURE — 99214 OFFICE O/P EST MOD 30 MIN: CPT | Mod: PBBFAC

## 2023-01-17 RX ORDER — LORATADINE 10 MG/1
10 TABLET ORAL DAILY
Qty: 60 TABLET | Refills: 3 | Status: SHIPPED | OUTPATIENT
Start: 2023-01-17 | End: 2023-10-24 | Stop reason: SDUPTHER

## 2023-01-17 RX ORDER — LOSARTAN POTASSIUM 25 MG/1
25 TABLET ORAL DAILY
Qty: 90 TABLET | Refills: 3 | Status: SHIPPED | OUTPATIENT
Start: 2023-01-17 | End: 2023-10-24 | Stop reason: SDUPTHER

## 2023-01-17 RX ORDER — AMLODIPINE BESYLATE 5 MG/1
5 TABLET ORAL DAILY
Qty: 60 TABLET | Refills: 3 | Status: SHIPPED | OUTPATIENT
Start: 2023-01-17 | End: 2023-01-17 | Stop reason: ALTCHOICE

## 2023-01-17 NOTE — PROGRESS NOTES
[facility] INTERNAL MEDICINE  OUTPATIENT OFFICE VISIT NOTE    SUBJECTIVE:      Chief Complaint: Follow-up (No changes since last visit)     HPI: Keri Sahni is a 57 y.o. yo female w/ PMH of chronic back pain, HTN, depression/anxiety, allergies, and insomnia who presents for follow-up (last seen in March 2022).  Patient states she has been feeling well since last visit.  She was seen in urgent care in November of 2022 for lower respiratory infection and was given antibiotics. She has had no problems or associated symptoms since completing treatment. She is endorsing some skin discoloration below her eyes that she first noticed a few months ago.  She has been trying a homemade tumeric cream on her skin for the last three months but has not noticed any significant changes.  She denies any itchiness, redness, or pain.  She has no other skin discoloration but does have a history of eczema that usually presents on her hands and is well controlled with lotion and otc creams.  She is also endorsing some mild BLLE swelling today that has been ongoing for some time.  She has no other complaints at this time and states her mood and depressive symptoms are well controlled on medication.  She denies any N/V, F/C, chest pain, diarrhea, constipation, dysuria, or abdominal pain.      Past Medical History:   has a past medical history of Back injury, Benign essential HTN, Depression, Insomnia, Knee pain, Obesity, Pregnancy, and Sinusitis.     Past Surgical History:   has a past surgical history that includes Cataract extraction (Left, 04/01/2021); Cataract extraction (Right, 11/19/2020); Appendectomy; Hysterectomy; Tonsillectomy; Neck surgery; Knee surgery; and Heel spur surgery.     Family History:  family history includes Blindness in her maternal grandmother; Cancer in her maternal uncle; Cataracts in her father and sister; Diabetes in her father and mother; Glaucoma in her father, maternal grandmother, and sister; Hypertension in  her father, mother, and sister; No Known Problems in her brother, maternal aunt, maternal grandfather, paternal aunt, paternal grandfather, paternal grandmother, and paternal uncle; Stroke in her cousin and sister; Thyroid disease in her sister.     Social History:   reports that she has quit smoking. Her smoking use included cigarettes. She has never used smokeless tobacco. She reports that she does not currently use alcohol. She reports that she does not use drugs.     Allergies:  has No Known Allergies.     Home Medications:  Prior to Admission medications    Medication Sig Start Date End Date Taking? Authorizing Provider   albuterol (PROVENTIL/VENTOLIN HFA) 90 mcg/actuation inhaler   See Instructions, INHALE 2 PUFFS BY MOUTH EVERY 6 HOURS AS NEEDED FOR WHEEZING, # 2 EA, 6 Refill(s), Pharmacy: [id number], 159, cm, Height/Length Dosing, 09/01/21 14:36:00 CDT, 90.5, kg, Weight Dosing, 09/01/21 14:36:00 CDT 2/4/22   Historical Provider   amLODIPine (NORVASC) 5 MG tablet Take 1 tablet by mouth once daily 12/8/22   Micah Aguilar MD   chlorthalidone (HYGROTEN) 25 MG Tab Take 25 mg by mouth once daily. 1/10/22   Historical Provider   fluticasone propionate (FLONASE) 50 mcg/actuation nasal spray 1 spray (50 mcg total) by Each Nostril route once daily. 12/12/22   David Aguilar DO   guaiFENesin (MUCINEX) 600 mg 12 hr tablet Take 600 mg by mouth. 8/4/21   Historical Provider   loratadine (CLARITIN) 10 mg tablet Take 10 mg by mouth once daily. 5/13/22   Historical Provider   potassium chloride (K-TAB) 20 mEq Take 20 mEq by mouth. 8/23/21   Historical Provider     Review of Systems   HENT:  Negative for hearing loss.    Eyes:  Negative for discharge.   Respiratory:  Positive for wheezing.    Cardiovascular:  Negative for chest pain and palpitations.   Gastrointestinal:  Negative for blood in stool, constipation, diarrhea and vomiting.   Genitourinary:  Negative for dysuria and hematuria.   Musculoskeletal:   Positive for neck pain.   Neurological:  Positive for headaches. Negative for weakness.   Endo/Heme/Allergies:  Negative for polydipsia.       OBJECTIVE:     Vital signs:   /80 (BP Location: Left arm, Patient Position: Sitting, BP Method: Large (Automatic))   Pulse 81   Temp 98.2 °F (36.8 °C) (Oral)   Resp 18   Ht 5' (1.524 m)   Wt 90.1 kg (198 lb 9.6 oz)   BMI 38.79 kg/m²      Physical Examination:  General: Patient resting comfortably, in no acute distress   Eye: PERRLA, EOMI, clear conjunctiva, eyelids normal  HENT: Head-normocephalic and atraumatic  Neck: full range of motion, no thyromegaly or lymphadenopathy, trachea midline, supple, no palpable thyroid nodules  Respiratory: clear to auscultation bilaterally without wheezes, rales, rhonchi  Cardiovascular: regular rate and rhythm without murmurs.  No gallops or rubs no JVD.  Capillary refill within normal limits.  Gastrointestinal: soft, non-tender, non-distended with normal bowel sounds, without masses to palpation  Genitourinary: no CVA tenderness to palpation  Musculoskeletal: full range of motion of all extremities/spine without limitation or discomfort  Integumentary: dark discoloration noted below eyes bilaterally.  No other skin changes not on face.   Neurologic: no signs of peripheral neurological deficit, motor/sensory function intact  Psychiatric:  alert and oriented, cognitive function intact, cooperative with exam, good eye contact, judgement and insight intact, mood and affect full range.     Labs:  CMP:   Lab Results   Component Value Date    GLUCOSE 95 03/24/2022    CALCIUM 10.3 03/24/2022    ALBUMIN 4.3 03/24/2022     03/24/2022    K 3.8 03/24/2022    CO2 31 03/24/2022    BUN 12.3 03/24/2022    CREATININE 0.71 03/24/2022    ALKPHOS 71 03/24/2022    ALT 17 03/24/2022    AST 18 03/24/2022    BILITOT 0.3 03/24/2022      CBC:   Lab Results   Component Value Date    WBC 9.3 03/24/2022    HGB 14.5 03/24/2022    HCT 40.0 03/24/2022     MCV 79.2 03/24/2022    RDW 14.2 03/24/2022     FLP:   Lab Results   Component Value Date    CHOL 214 03/24/2022    HDL 50 03/24/2022    .00 03/24/2022    TRIG 112 03/24/2022    TOTALCHOLEST 4 03/24/2022     DM:   Lab Results   Component Value Date    HGBA1C 5.3 03/24/2022    .4 03/24/2022    CREATININE 0.71 03/24/2022     Thyroid:   Lab Results   Component Value Date    TSH 2.4621 03/24/2022    QNMALO5WWDD 1.03 11/23/2020     LFTs:   Lab Results   Component Value Date    LABPROT 7.8 03/24/2022    ALBUMIN 4.3 03/24/2022    AST 18 03/24/2022    ALT 17 03/24/2022    ALKPHOS 71 03/24/2022       ASSESSMENT & PLAN:     Chronic back pain  -Sees pain management specialist, was seen last week   --Had recent MRI on 11/15, following with ortho   -Prescribed Norco 10 mg and duloxetine 60 mg by pain management  -Pain is well controlled on this regimen  -Patient should continue seeing them and continue these medications    Hypertension  -Blood pressure today 127/80  -discontinuing amlodipine today secondary to lower extremity swelling   -pt previously on lisinopril but was discontinued secondary to chronic cough   -will start Losartan today and re-evaluate at next visit   -informed patient to keep log of home blood pressures to review at next visit     Allergies  --continue loratadine 10 mg daily    Depression/anxiety  -Continue BuSpar 5 mg daily  -Should continue scheduled follow-ups with psychiatrist     Skin Discoloration   -recommended patient discontinue home remedy of tumeric cream and to keep face well hydrated and to wear sunscreen daily   -referral sent to dermatology for further workup     Patient does not want tetanus, shingles shot today, declines flu shot, declines Covid shots  Patient had GYN exam and Pap smear 3/9/22  Last Mammogram performed in 4/2022, negative   Fit test negative 8/1/2021, will order repeat today     Return to clinic in 3 month(s).    Trisha Smalls MD  [facility] Internal Medicine,  HO-1

## 2023-02-15 LAB — NONINV COLON CA DNA+OCC BLD SCRN STL QL: POSITIVE

## 2023-02-16 DIAGNOSIS — R19.5 POSITIVE COLORECTAL CANCER SCREENING USING COLOGUARD TEST: Primary | ICD-10-CM

## 2023-02-17 DIAGNOSIS — T78.40XA ALLERGY, INITIAL ENCOUNTER: Primary | ICD-10-CM

## 2023-02-17 RX ORDER — FLUTICASONE PROPIONATE 50 MCG
1 SPRAY, SUSPENSION (ML) NASAL DAILY
Qty: 11.1 ML | Refills: 1 | Status: SHIPPED | OUTPATIENT
Start: 2023-02-17 | End: 2023-05-17 | Stop reason: SDUPTHER

## 2023-02-20 ENCOUNTER — TELEPHONE (OUTPATIENT)
Dept: INTERNAL MEDICINE | Facility: CLINIC | Age: 58
End: 2023-02-20
Payer: MEDICAID

## 2023-02-20 ENCOUNTER — TELEPHONE (OUTPATIENT)
Dept: GASTROENTEROLOGY | Facility: CLINIC | Age: 58
End: 2023-02-20
Payer: MEDICAID

## 2023-02-20 DIAGNOSIS — R19.5 POSITIVE COLORECTAL CANCER SCREENING USING COLOGUARD TEST: Primary | ICD-10-CM

## 2023-02-20 NOTE — TELEPHONE ENCOUNTER
----- Message from David Aguilar DO sent at 2/20/2023 10:13 AM CST -----  Hello,    Please infrom Ms. Sahni that her cologuard test is positive. I have tried call her a few times, but have been unable to reach her. Colonoscopy referral has been placed and they will call with scheduling. Thanks!    David Aguilar DO

## 2023-02-20 NOTE — TELEPHONE ENCOUNTER
Patient made aware and verbalized understanding. Patient stated GI lab called her and scheduled C-scope for 9/7/2023.

## 2023-04-04 ENCOUNTER — TELEPHONE (OUTPATIENT)
Dept: INTERNAL MEDICINE | Facility: CLINIC | Age: 58
End: 2023-04-04
Payer: MEDICAID

## 2023-04-04 NOTE — TELEPHONE ENCOUNTER
----- Message from Scarlett Alexandre sent at 4/3/2023  3:27 PM CDT -----  Regarding: Dr. Aguilar-Date of Labs  Patient has an appointment on 04/12/23 but was told her labs couldn't be drawn until 04/17. Patient is requesting that the date of her labs be changed to the morning of her appointment. Please inform patient when the date has been changed. Thanks

## 2023-04-04 NOTE — TELEPHONE ENCOUNTER
Please review and advise when changing labs date /or if date is going to be changed . Pt is requesting to change date of labs to the morning of 4/12/2023. Thanks

## 2023-04-10 ENCOUNTER — LAB VISIT (OUTPATIENT)
Dept: LAB | Facility: HOSPITAL | Age: 58
End: 2023-04-10
Attending: STUDENT IN AN ORGANIZED HEALTH CARE EDUCATION/TRAINING PROGRAM
Payer: MEDICAID

## 2023-04-10 DIAGNOSIS — I10 HYPERTENSION, UNSPECIFIED TYPE: ICD-10-CM

## 2023-04-10 LAB
ALBUMIN SERPL-MCNC: 4.4 G/DL (ref 3.5–5)
ALBUMIN/GLOB SERPL: 1.4 RATIO (ref 1.1–2)
ALP SERPL-CCNC: 74 UNIT/L (ref 40–150)
ALT SERPL-CCNC: 12 UNIT/L (ref 0–55)
AST SERPL-CCNC: 15 UNIT/L (ref 5–34)
BASOPHILS # BLD AUTO: 0.05 X10(3)/MCL (ref 0–0.2)
BASOPHILS NFR BLD AUTO: 0.5 %
BILIRUBIN DIRECT+TOT PNL SERPL-MCNC: 0.5 MG/DL
BUN SERPL-MCNC: 9.7 MG/DL (ref 9.8–20.1)
CALCIUM SERPL-MCNC: 9.9 MG/DL (ref 8.4–10.2)
CHLORIDE SERPL-SCNC: 106 MMOL/L (ref 98–107)
CHOLEST SERPL-MCNC: 200 MG/DL
CHOLEST/HDLC SERPL: 4 {RATIO} (ref 0–5)
CO2 SERPL-SCNC: 28 MMOL/L (ref 22–29)
CREAT SERPL-MCNC: 0.79 MG/DL (ref 0.55–1.02)
EOSINOPHIL # BLD AUTO: 0.19 X10(3)/MCL (ref 0–0.9)
EOSINOPHIL NFR BLD AUTO: 2.1 %
ERYTHROCYTE [DISTWIDTH] IN BLOOD BY AUTOMATED COUNT: 13.7 % (ref 11.5–17)
EST. AVERAGE GLUCOSE BLD GHB EST-MCNC: 102.5 MG/DL
GFR SERPLBLD CREATININE-BSD FMLA CKD-EPI: >60 MLS/MIN/1.73/M2
GLOBULIN SER-MCNC: 3.2 GM/DL (ref 2.4–3.5)
GLUCOSE SERPL-MCNC: 112 MG/DL (ref 74–100)
HBA1C MFR BLD: 5.2 %
HCT VFR BLD AUTO: 40 % (ref 37–47)
HDLC SERPL-MCNC: 48 MG/DL (ref 35–60)
HGB BLD-MCNC: 13.9 G/DL (ref 12–16)
IMM GRANULOCYTES # BLD AUTO: 0.02 X10(3)/MCL (ref 0–0.04)
IMM GRANULOCYTES NFR BLD AUTO: 0.2 %
LDLC SERPL CALC-MCNC: 133 MG/DL (ref 50–140)
LYMPHOCYTES # BLD AUTO: 3.27 X10(3)/MCL (ref 0.6–4.6)
LYMPHOCYTES NFR BLD AUTO: 35.9 %
MCH RBC QN AUTO: 27.6 PG (ref 27–31)
MCHC RBC AUTO-ENTMCNC: 34.8 G/DL (ref 33–36)
MCV RBC AUTO: 79.4 FL (ref 80–94)
MONOCYTES # BLD AUTO: 0.64 X10(3)/MCL (ref 0.1–1.3)
MONOCYTES NFR BLD AUTO: 7 %
NEUTROPHILS # BLD AUTO: 4.95 X10(3)/MCL (ref 2.1–9.2)
NEUTROPHILS NFR BLD AUTO: 54.3 %
NRBC BLD AUTO-RTO: 0 %
PLATELET # BLD AUTO: 205 X10(3)/MCL (ref 130–400)
PMV BLD AUTO: 11.5 FL (ref 7.4–10.4)
POTASSIUM SERPL-SCNC: 3.8 MMOL/L (ref 3.5–5.1)
PROT SERPL-MCNC: 7.6 GM/DL (ref 6.4–8.3)
RBC # BLD AUTO: 5.04 X10(6)/MCL (ref 4.2–5.4)
SODIUM SERPL-SCNC: 142 MMOL/L (ref 136–145)
TRIGL SERPL-MCNC: 93 MG/DL (ref 37–140)
VLDLC SERPL CALC-MCNC: 19 MG/DL
WBC # SPEC AUTO: 9.1 X10(3)/MCL (ref 4.5–11.5)

## 2023-04-10 PROCEDURE — 85025 COMPLETE CBC W/AUTO DIFF WBC: CPT

## 2023-04-10 PROCEDURE — 80053 COMPREHEN METABOLIC PANEL: CPT

## 2023-04-10 PROCEDURE — 36415 COLL VENOUS BLD VENIPUNCTURE: CPT

## 2023-04-10 PROCEDURE — 80061 LIPID PANEL: CPT

## 2023-04-10 PROCEDURE — 83036 HEMOGLOBIN GLYCOSYLATED A1C: CPT

## 2023-04-11 NOTE — TELEPHONE ENCOUNTER
Pt called, message left informing pt of labs ordered and need to be drawn prior to next appointment. Informed pt if any questions to call [facility] at [phone] with any questions. Call ended.

## 2023-04-17 ENCOUNTER — DOCUMENTATION ONLY (OUTPATIENT)
Dept: ADMINISTRATIVE | Facility: HOSPITAL | Age: 58
End: 2023-04-17
Payer: MEDICAID

## 2023-04-19 ENCOUNTER — OFFICE VISIT (OUTPATIENT)
Dept: OPHTHALMOLOGY | Facility: CLINIC | Age: 58
End: 2023-04-19
Payer: MEDICAID

## 2023-04-19 ENCOUNTER — TELEPHONE (OUTPATIENT)
Dept: INTERNAL MEDICINE | Facility: CLINIC | Age: 58
End: 2023-04-19
Payer: MEDICAID

## 2023-04-19 VITALS — BODY MASS INDEX: 39 KG/M2 | HEIGHT: 60 IN | WEIGHT: 198.63 LBS

## 2023-04-19 DIAGNOSIS — H50.812 DUANE'S SYNDROME OF BOTH EYES: ICD-10-CM

## 2023-04-19 DIAGNOSIS — H50.811 DUANE'S SYNDROME OF BOTH EYES: ICD-10-CM

## 2023-04-19 DIAGNOSIS — Z96.1 PSEUDOPHAKIA, BOTH EYES: Primary | ICD-10-CM

## 2023-04-19 PROCEDURE — 99212 OFFICE O/P EST SF 10 MIN: CPT | Mod: PBBFAC,PO | Performed by: STUDENT IN AN ORGANIZED HEALTH CARE EDUCATION/TRAINING PROGRAM

## 2023-04-19 RX ORDER — AMLODIPINE BESYLATE 5 MG/1
5 TABLET ORAL
COMMUNITY
Start: 2023-01-17 | End: 2023-10-24

## 2023-04-19 NOTE — PROGRESS NOTES
HPI    Yearly dilated exam  Last edited by Adriana Alicea MA on 4/19/2023  3:28 PM.            Assessment /Plan     For exam results, see Encounter Report.    Pseudophakia, both eyes    Duane's syndrome of both eyes          Pseudophakia, OU  - doing well monitor    2. Duane's syndrome OS, Type 1  - no issues in primary gaze, denies diplopia  - monitor    3. Optic disc drusen OS  - MRI and MRV in 2012 which was wnl. HVF was wnl in past  - Red free fundus photos taken in past to document  - B scan 3/2021 showing drusen OS  - normal fundus exam  - monitor    RTC 1 year

## 2023-04-19 NOTE — TELEPHONE ENCOUNTER
Spoke w/pt. Verified name and . Gave pt Dr Aguilar's message regarding lab results. Pt verbalized understanding and had no questions.

## 2023-04-19 NOTE — TELEPHONE ENCOUNTER
----- Message from David Aguilar DO sent at 4/18/2023 10:12 AM CDT -----  Hello,    Please inform Ms. Sahni that her kidney and liver function is normal, blood counts are normal, there is no anemia. A1c is well controlled at 5.2, there is no diabetes. Continue to eat a low fat diet and exercise as tolerated, cholesterol is slightly elevated at 133. Thank you.    aDvid Aguilar DO

## 2023-04-20 PROBLEM — H50.811 DUANE'S SYNDROME OF BOTH EYES: Status: ACTIVE | Noted: 2023-04-20

## 2023-04-20 PROBLEM — H50.812 DUANE'S SYNDROME OF BOTH EYES: Status: ACTIVE | Noted: 2023-04-20

## 2023-05-17 DIAGNOSIS — T78.40XA ALLERGY, INITIAL ENCOUNTER: ICD-10-CM

## 2023-05-17 RX ORDER — FLUTICASONE PROPIONATE 50 MCG
1 SPRAY, SUSPENSION (ML) NASAL DAILY
Qty: 11.1 ML | Refills: 1 | Status: SHIPPED | OUTPATIENT
Start: 2023-05-17 | End: 2023-08-29 | Stop reason: SDUPTHER

## 2023-05-26 ENCOUNTER — OFFICE VISIT (OUTPATIENT)
Dept: DERMATOLOGY | Facility: CLINIC | Age: 58
End: 2023-05-26
Payer: MEDICAID

## 2023-05-26 ENCOUNTER — TELEPHONE (OUTPATIENT)
Dept: FAMILY MEDICINE | Facility: CLINIC | Age: 58
End: 2023-05-26
Payer: MEDICAID

## 2023-05-26 VITALS
RESPIRATION RATE: 18 BRPM | WEIGHT: 199 LBS | OXYGEN SATURATION: 98 % | SYSTOLIC BLOOD PRESSURE: 132 MMHG | TEMPERATURE: 98 F | DIASTOLIC BLOOD PRESSURE: 86 MMHG | HEIGHT: 60 IN | HEART RATE: 72 BPM | BODY MASS INDEX: 39.07 KG/M2

## 2023-05-26 DIAGNOSIS — L13.9 BULLOUS DISORDER, UNSPECIFIED: ICD-10-CM

## 2023-05-26 DIAGNOSIS — L30.9 ECZEMA, UNSPECIFIED TYPE: ICD-10-CM

## 2023-05-26 DIAGNOSIS — L63.9 ALOPECIA AREATA: ICD-10-CM

## 2023-05-26 DIAGNOSIS — L81.1 MELASMA: Primary | ICD-10-CM

## 2023-05-26 DIAGNOSIS — I10 HYPERTENSION, UNSPECIFIED TYPE: ICD-10-CM

## 2023-05-26 PROCEDURE — 99215 OFFICE O/P EST HI 40 MIN: CPT | Mod: PBBFAC | Performed by: DERMATOLOGY

## 2023-05-26 RX ORDER — FLUOCINONIDE TOPICAL SOLUTION USP, 0.05% 0.5 MG/ML
SOLUTION TOPICAL 2 TIMES DAILY
Qty: 60 ML | Refills: 1 | Status: SHIPPED | OUTPATIENT
Start: 2023-05-26 | End: 2023-09-29 | Stop reason: SDUPTHER

## 2023-05-26 RX ORDER — BETAMETHASONE DIPROPIONATE 0.5 MG/G
CREAM TOPICAL 2 TIMES DAILY
COMMUNITY
End: 2024-02-23

## 2023-05-26 RX ORDER — TERBINAFINE HYDROCHLORIDE 250 MG/1
250 TABLET ORAL DAILY
Qty: 14 TABLET | Refills: 0 | Status: SHIPPED | OUTPATIENT
Start: 2023-05-26 | End: 2023-06-09

## 2023-05-26 RX ORDER — KETOCONAZOLE 20 MG/ML
SHAMPOO, SUSPENSION TOPICAL
Qty: 120 ML | Refills: 1 | Status: SHIPPED | OUTPATIENT
Start: 2023-05-29 | End: 2023-07-18 | Stop reason: SDUPTHER

## 2023-05-26 RX ORDER — KETOCONAZOLE 20 MG/G
CREAM TOPICAL DAILY
Qty: 60 G | Refills: 1 | Status: SHIPPED | OUTPATIENT
Start: 2023-05-26 | End: 2023-09-29 | Stop reason: SDUPTHER

## 2023-05-26 NOTE — PROGRESS NOTES
Patient ID: Keri Sahni is a 57 y.o. female.    Chief Complaint: Skin Discoloration (face), skin lesion (Heel of right foot and on sides of hands), and Hair/Scalp Problem    HPI  56 yo F presents to derm clinic for initial visit,    Referred to derm clinic for skin discoloration of face and hands.     Today, she reports skin blisters located on the sole of her foot. She thinks it may be related to shoe type. She wears closed toe shoes. Skin blisters are itchy but not painful.     She also reports hair loss and facial skin darkening and itchiness of her fingers. She has tried topical steroid cream for itchiness (betamethasone 0.05% topical twice daily).    Denies systemic symptoms, joint disorders, or hx of autoimmune disease    Review of Systems  See above     Objective   Vitals:    05/26/23 0917   BP: 132/86   Pulse: 72   Resp: 18   Temp: 97.7 °F (36.5 °C)     Physical Exam  Gen arti: NAD  Skin: Exclamation points, loss of hair near central and frontal scalp area, skin darkening of face, several dry patches near her fingers, circular dry skin with central clearing near sole of right foot     Assessment and Plan     Problem List Items Addressed This Visit    None  Visit Diagnoses       Melasma    -  Primary    Hypertension, unspecified type        Alopecia areata        Relevant Medications    ketoconazole (NIZORAL) 2 % shampoo (Start on 5/29/2023)    Other Relevant Orders    Ambulatory referral/consult to Family Practice    Eczema, unspecified type        Relevant Medications    fluocinonide (LIDEX) 0.05 % external solution    Bullous disorder, unspecified              - Prescribed Lamisil 250 mg daily x 2 weeks and ketoconazole cream 2% to affected area for 4 weeks for tinea bullous. No drug interactions with her other medications found.   - Prescribed Lidex 0.05 % external solution 2 times daily to affected skin lesions, try unscented lotion  - Advised patient to avoid sun or irritant for dx of melasma  -  Recommend buying Rogaine over the counter for alopecia and encourage use of ketoconazole shampoo for eczema   - Refer to minor procedures for kenalog 2.5 % (3 cc) over scalp area x period time      RTC 4 months

## 2023-05-26 NOTE — PROGRESS NOTES
Patient seen and examined with resident, agree with plan as outlined.bullous tinea of feet; frontal fibrosing alopecia of scalp (lonely hairs, hair line progressing posteriorly, scarring with loss of follicles)-prognosis/expectations discussed with pt and plan to try to maintain current hair/decrease inflammatory scarring process; plan for monthly ILK inj with minor procedures clinic x 4 followed by f/u in derm      Evan Casillas MD  [facility] - Dermatology

## 2023-06-12 ENCOUNTER — OFFICE VISIT (OUTPATIENT)
Dept: URGENT CARE | Facility: CLINIC | Age: 58
End: 2023-06-12
Payer: MEDICAID

## 2023-06-12 VITALS
TEMPERATURE: 98 F | HEIGHT: 60 IN | WEIGHT: 199.38 LBS | OXYGEN SATURATION: 96 % | DIASTOLIC BLOOD PRESSURE: 83 MMHG | HEART RATE: 83 BPM | BODY MASS INDEX: 39.14 KG/M2 | RESPIRATION RATE: 16 BRPM | SYSTOLIC BLOOD PRESSURE: 137 MMHG

## 2023-06-12 DIAGNOSIS — B96.89 SINUSITIS, BACTERIAL: Primary | ICD-10-CM

## 2023-06-12 DIAGNOSIS — J32.9 SINUSITIS, BACTERIAL: Primary | ICD-10-CM

## 2023-06-12 PROCEDURE — 99215 OFFICE O/P EST HI 40 MIN: CPT | Mod: PBBFAC | Performed by: FAMILY MEDICINE

## 2023-06-12 PROCEDURE — 99213 OFFICE O/P EST LOW 20 MIN: CPT | Mod: S$PBB,,, | Performed by: FAMILY MEDICINE

## 2023-06-12 PROCEDURE — 99213 PR OFFICE/OUTPT VISIT, EST, LEVL III, 20-29 MIN: ICD-10-PCS | Mod: S$PBB,,, | Performed by: FAMILY MEDICINE

## 2023-06-12 RX ORDER — AZITHROMYCIN 250 MG/1
TABLET, FILM COATED ORAL
Qty: 6 TABLET | Refills: 0 | Status: SHIPPED | OUTPATIENT
Start: 2023-06-12 | End: 2023-06-17

## 2023-06-12 RX ORDER — PROMETHAZINE HYDROCHLORIDE AND DEXTROMETHORPHAN HYDROBROMIDE 6.25; 15 MG/5ML; MG/5ML
5 SYRUP ORAL
Qty: 60 ML | Refills: 0 | Status: SHIPPED | OUTPATIENT
Start: 2023-06-12

## 2023-06-12 NOTE — PROGRESS NOTES
"Subjective:      Patient ID: Keri Sahni is a 57 y.o. female.    Vitals:  height is 4' 11.61" (1.514 m) and weight is 90.4 kg (199 lb 6.4 oz). Her temperature is 98.2 °F (36.8 °C). Her blood pressure is 137/83 and her pulse is 83. Her respiration is 16 and oxygen saturation is 96%.     Chief Complaint: Cough (Cough, HA, ear pain x 1 week. States recurrent allergies. No testing at this time.)    Cough  The current episode started in the past 7 days. The problem has been gradually worsening. The cough is Productive of sputum. Associated symptoms include ear congestion, nasal congestion, postnasal drip and rhinorrhea. Pertinent negatives include no chest pain, chills, ear pain, fever, headaches, hemoptysis, myalgias, rash, sore throat, shortness of breath, sweats or wheezing.     Constitution: Negative for chills and fever.   HENT:  Positive for postnasal drip. Negative for ear pain and sore throat.    Cardiovascular:  Negative for chest pain.   Respiratory:  Positive for cough. Negative for bloody sputum, shortness of breath and wheezing.    Musculoskeletal:  Negative for muscle ache.   Skin:  Negative for rash.   Neurological:  Negative for headaches.    Objective:     Physical Exam   Constitutional: She appears well-developed.  Non-toxic appearance. She does not appear ill. No distress.   HENT:   Head: Atraumatic.   Nose: No purulent discharge. Right sinus exhibits no maxillary sinus tenderness and no frontal sinus tenderness. Left sinus exhibits no maxillary sinus tenderness and no frontal sinus tenderness.   Mouth/Throat: Uvula is midline. Cobblestoning present. No posterior oropharyngeal edema.   Eyes: Right eye exhibits no discharge. Left eye exhibits no discharge. Extraocular movement intact   Neck: Neck supple.   Cardiovascular: Regular rhythm.   Pulmonary/Chest: Effort normal and breath sounds normal. No respiratory distress. She has no wheezes. She has no rales.   Lymphadenopathy:     She has no cervical " adenopathy.   Neurological: She is alert.   Skin: Skin is warm, dry and not diaphoretic.   Psychiatric: Her behavior is normal.   Nursing note and vitals reviewed.    Assessment:     1. Sinusitis, bacterial        Plan:   Had a lengthy discussion about postnasal drip and postviral cough.  Patient requesting antibiotics.  States a Z-Aayush usually helps.  After much discussed in, agreed to give her prescription but advised to use a wait and see approach.  She agreed that she would do so.  Phenergan DM with side effect precautions as well.    Sinusitis, bacterial    Other orders  -     azithromycin (Z-AAYUSH) 250 MG tablet; Take 2 tablets (500 mg total) by mouth once daily for 1 day, THEN 1 tablet (250 mg total) once daily for 4 days.  Dispense: 6 tablet; Refill: 0  -     promethazine-dextromethorphan (PROMETHAZINE-DM) 6.25-15 mg/5 mL Syrp; Take 5 mLs by mouth every 6 to 8 hours as needed (cough). May cause sedation.  Do not drive or operate heavy machinery.  Dispense: 60 mL; Refill: 0

## 2023-06-26 ENCOUNTER — OFFICE VISIT (OUTPATIENT)
Dept: FAMILY MEDICINE | Facility: CLINIC | Age: 58
End: 2023-06-26
Payer: MEDICAID

## 2023-06-26 VITALS
HEART RATE: 82 BPM | RESPIRATION RATE: 18 BRPM | WEIGHT: 195 LBS | HEIGHT: 59 IN | DIASTOLIC BLOOD PRESSURE: 85 MMHG | OXYGEN SATURATION: 99 % | BODY MASS INDEX: 39.31 KG/M2 | TEMPERATURE: 99 F | SYSTOLIC BLOOD PRESSURE: 125 MMHG

## 2023-06-26 DIAGNOSIS — L63.9 ALOPECIA AREATA: ICD-10-CM

## 2023-06-26 PROCEDURE — 99214 OFFICE O/P EST MOD 30 MIN: CPT | Mod: PBBFAC

## 2023-06-26 PROCEDURE — 11900 INJECT SKIN LESIONS </W 7: CPT | Mod: PBBFAC | Performed by: STUDENT IN AN ORGANIZED HEALTH CARE EDUCATION/TRAINING PROGRAM

## 2023-06-26 NOTE — PROGRESS NOTES
I have seen the patient, reviewed the resident's history and physical, assessment, plan, and progress note. I have personally interviewed and examined the patient at bedside and: agree with the findings.     Michael Crabtree MD  [facility]

## 2023-06-26 NOTE — PROCEDURES
Procedures  Procedure: Intralesional Kenalog Injection  Indication: Alopecia areata   Consent: Risks and benefits of therapy discussed with patient who voices understanding and agrees with planned care. No barriers to communication or understanding identified. After obtaining informed consent, the patient's identity, procedure, and site were verified during a pause prior to proceeding with the minor surgical procedure as per universal protocol recommendations.   Area to frontal scalp cleaned with alcohol. Total of 3cc of 2.5% Kenalog injected to affected areas. Minimal bleeding from injection site noted and patient tolerated procedure well without complication. EBL <5 cc.

## 2023-06-26 NOTE — PROGRESS NOTES
[facility] Office Visit Note    CC:   alopecia       HPI:  Keri Sahni is a 58 y.o. female who presents for intradermal kenalog injection    Seen in DERM 5/26/23   Referred for Kenalog injection for alopecia areata q4 weeks x4   Not using topical Rogaine   NKDA     Review of Systems:   Review of Systems   Constitutional:  Negative for fever.      Current Outpatient Medications   Medication Instructions    albuterol (PROVENTIL/VENTOLIN HFA) 90 mcg/actuation inhaler   See Instructions, INHALE 2 PUFFS BY MOUTH EVERY 6 HOURS AS NEEDED FOR WHEEZING, # 2 EA, 6 Refill(s), Pharmacy: [id number], 159, cm, Height/Length Dosing, 09/01/21 14:36:00 CDT, 90.5, kg, Weight Dosing, 09/01/21 14:36:00 CDT    amLODIPine (NORVASC) 5 mg, Oral    betamethasone dipropionate 0.05 % cream Topical (Top), 2 times daily    fluocinonide (LIDEX) 0.05 % external solution Topical (Top), 2 times daily    fluticasone propionate (FLONASE) 50 mcg, Each Nostril, Daily    guaiFENesin (MUCINEX) 600 mg, Oral    ketoconazole (NIZORAL) 2 % cream Topical (Top), Daily, Apply for 4 weeks only    ketoconazole (NIZORAL) 2 % shampoo Topical (Top), Twice weekly    loratadine (CLARITIN) 10 mg, Oral, Daily    losartan (COZAAR) 25 mg, Oral, Daily    promethazine-dextromethorphan (PROMETHAZINE-DM) 6.25-15 mg/5 mL Syrp 5 mLs, Oral, Every 6-8 hours PRN, May cause sedation.  Do not drive or operate heavy machinery.        Physical Exam:   Vitals:    06/26/23 0929   BP: 125/85   Pulse: 82   Resp: 18   Temp: 98.6 °F (37 °C)      Physical Exam   General: Pleasant, in no distress   Respiratory: Non labored respirations on RA  Skin: Patchy hair loss to frontal scalp, general thinning     Assessment/Plan:  1. Alopecia areata    - Topical Rogaine   - Discussed risk/benefit of intradermal Kenalog injections   - She agrees, consented and performed today without complication    - RTC 4 weeks for repeat injections       Sam Nunes M.D. [facility] Family  Medicine

## 2023-07-10 RX ORDER — ALBUTEROL SULFATE 90 UG/1
AEROSOL, METERED RESPIRATORY (INHALATION)
Qty: 18 G | Refills: 3 | Status: SHIPPED | OUTPATIENT
Start: 2023-07-10 | End: 2023-09-05 | Stop reason: SDUPTHER

## 2023-07-18 ENCOUNTER — OFFICE VISIT (OUTPATIENT)
Dept: FAMILY MEDICINE | Facility: CLINIC | Age: 58
End: 2023-07-18
Payer: MEDICAID

## 2023-07-18 VITALS
WEIGHT: 199 LBS | BODY MASS INDEX: 40.12 KG/M2 | HEART RATE: 76 BPM | RESPIRATION RATE: 17 BRPM | OXYGEN SATURATION: 97 % | SYSTOLIC BLOOD PRESSURE: 136 MMHG | DIASTOLIC BLOOD PRESSURE: 61 MMHG | TEMPERATURE: 99 F | HEIGHT: 59 IN

## 2023-07-18 DIAGNOSIS — L63.9 ALOPECIA AREATA: Primary | ICD-10-CM

## 2023-07-18 PROCEDURE — 96372 THER/PROPH/DIAG INJ SC/IM: CPT | Mod: 59,PBBFAC

## 2023-07-18 PROCEDURE — 99214 OFFICE O/P EST MOD 30 MIN: CPT | Mod: PBBFAC

## 2023-07-18 PROCEDURE — 11900 INJECT SKIN LESIONS </W 7: CPT | Mod: PBBFAC

## 2023-07-18 RX ORDER — KETOCONAZOLE 20 MG/ML
SHAMPOO, SUSPENSION TOPICAL
Qty: 120 ML | Refills: 1 | Status: SHIPPED | OUTPATIENT
Start: 2023-07-20 | End: 2023-09-29 | Stop reason: SDUPTHER

## 2023-07-18 RX ADMIN — TRIAMCINOLONE ACETONIDE 20 MG: 10 INJECTION, SUSPENSION INTRA-ARTICULAR; INTRALESIONAL at 03:07

## 2023-07-18 NOTE — PROGRESS NOTES
"  Subjective:       Patient ID: Keri Sahni is a 58 y.o. female.    Chief Complaint: Alopecia      Alopecia  Patient here for her second round of Kenalog injections. States her alopecia has remarkably improved after her first round last month.     Review of Systems   Endocrine: Positive for hair loss.   Integumentary:         As per HPI       Objective:       Vital Signs  Temp: 98.7 °F (37.1 °C)  Temp Source: Oral  Pulse: 76  Resp: 17  SpO2: 97 %  BP: 136/61  BP Location: Left arm  Patient Position: Sitting  Pain Score: 0-No pain  Height and Weight  Height: 4' 11.45" (151 cm)  Weight: 90.3 kg (199 lb)  BSA (Calculated - sq m): 1.95 sq meters  BMI (Calculated): 39.6  Weight in (lb) to have BMI = 25: 125.4]  Physical Exam  HENT:      Head: Normocephalic and atraumatic.   Eyes:      Extraocular Movements: Extraocular movements intact.   Skin:     General: Skin is warm and dry.      Coloration: Skin is not jaundiced.      Comments: Scalp: areas of patchiness, improved compared to last visit   Neurological:      Mental Status: She is alert.   Psychiatric:      Comments: Mood and behavior appropriate         Procedure Note:  Procedure: Kenalog Intradermal Injections  Performed by: Mitchel Ding MD  Supervised by: Robyn Perez MD  Consent: signed consent obtained after discussion of risks, benefits, and alternative treatments  Description:  Head was cleaned with alcohol wipes. 2cc of Triamcinolone was injected intradermally .1mL at a time across scalp at areas of patchiness. Blood loss minimal.   The patient tolerated the procedure well with no complications.         Assessment:       Problem List Items Addressed This Visit    None  Visit Diagnoses       Alopecia areata    -  Primary    Relevant Medications    triamcinolone acetonide injection 20 mg (Start on 7/18/2023  3:15 PM)            Plan:   Alopecia areata  - Follow up 4 weeks for next round of Kenalog injections  - ketoconazole shampoo rx refilled     "

## 2023-07-21 NOTE — PROGRESS NOTES
I have seen and examined the patient with the resident at the time of the appointment. The chart was reviewed. I agree with the assessment and plan. Care provided was reasonable and necessary. I was present for the procedure.    2mL of kenalog 10mg injected across scalp. Significantly more hair at central aspect of head compared to pictures in chart.

## 2023-08-08 DIAGNOSIS — Z12.11 SCREENING FOR COLON CANCER: Primary | ICD-10-CM

## 2023-08-08 RX ORDER — POLYETHYLENE GLYCOL 3350, SODIUM SULFATE, SODIUM CHLORIDE, POTASSIUM CHLORIDE, SODIUM ASCORBATE, AND ASCORBIC ACID 7.5-2.691G
KIT ORAL
Qty: 1 KIT | Refills: 0 | Status: SHIPPED | OUTPATIENT
Start: 2023-08-08 | End: 2023-09-08

## 2023-08-29 DIAGNOSIS — T78.40XA ALLERGY, INITIAL ENCOUNTER: ICD-10-CM

## 2023-08-29 RX ORDER — FLUTICASONE PROPIONATE 50 MCG
1 SPRAY, SUSPENSION (ML) NASAL DAILY
Qty: 11.1 ML | Refills: 1 | Status: SHIPPED | OUTPATIENT
Start: 2023-08-29 | End: 2024-01-30 | Stop reason: SDUPTHER

## 2023-09-05 RX ORDER — ALBUTEROL SULFATE 90 UG/1
AEROSOL, METERED RESPIRATORY (INHALATION)
Qty: 18 G | Refills: 3 | Status: SHIPPED | OUTPATIENT
Start: 2023-09-05 | End: 2023-12-22 | Stop reason: SDUPTHER

## 2023-09-08 DIAGNOSIS — Z12.11 SCREEN FOR COLON CANCER: Primary | ICD-10-CM

## 2023-09-08 RX ORDER — POLYETHYLENE GLYCOL 3350, SODIUM SULFATE, SODIUM CHLORIDE, POTASSIUM CHLORIDE, SODIUM ASCORBATE, AND ASCORBIC ACID 7.5-2.691G
KIT ORAL
Qty: 1 KIT | Refills: 0 | Status: SHIPPED | OUTPATIENT
Start: 2023-09-08

## 2023-09-22 ENCOUNTER — OFFICE VISIT (OUTPATIENT)
Dept: FAMILY MEDICINE | Facility: CLINIC | Age: 58
End: 2023-09-22
Payer: MEDICAID

## 2023-09-22 VITALS
WEIGHT: 199 LBS | BODY MASS INDEX: 40.12 KG/M2 | SYSTOLIC BLOOD PRESSURE: 131 MMHG | HEIGHT: 59 IN | TEMPERATURE: 99 F | DIASTOLIC BLOOD PRESSURE: 84 MMHG | OXYGEN SATURATION: 99 % | RESPIRATION RATE: 18 BRPM | HEART RATE: 82 BPM

## 2023-09-22 DIAGNOSIS — L91.8 ACHROCHORDON: ICD-10-CM

## 2023-09-22 DIAGNOSIS — L82.1 SK (SEBORRHEIC KERATOSIS): ICD-10-CM

## 2023-09-22 DIAGNOSIS — L63.9 ALOPECIA AREATA: Primary | ICD-10-CM

## 2023-09-22 PROCEDURE — 17110 DESTRUCTION B9 LES UP TO 14: CPT | Mod: PBBFAC

## 2023-09-22 PROCEDURE — 96372 THER/PROPH/DIAG INJ SC/IM: CPT | Mod: 59,PBBFAC

## 2023-09-22 PROCEDURE — 99213 OFFICE O/P EST LOW 20 MIN: CPT | Mod: 25,PBBFAC

## 2023-09-22 PROCEDURE — 11900 INJECT SKIN LESIONS </W 7: CPT | Mod: 59,PBBFAC

## 2023-09-22 RX ORDER — TRIAMCINOLONE ACETONIDE 40 MG/ML
40 INJECTION, SUSPENSION INTRA-ARTICULAR; INTRAMUSCULAR
Status: COMPLETED | OUTPATIENT
Start: 2023-09-22 | End: 2023-09-22

## 2023-09-22 RX ORDER — LIDOCAINE HYDROCHLORIDE 10 MG/ML
3 INJECTION, SOLUTION EPIDURAL; INFILTRATION; INTRACAUDAL; PERINEURAL
Status: COMPLETED | OUTPATIENT
Start: 2023-09-22 | End: 2023-09-22

## 2023-09-22 RX ADMIN — LIDOCAINE HYDROCHLORIDE 30 MG: 10 INJECTION, SOLUTION EPIDURAL; INFILTRATION; INTRACAUDAL; PERINEURAL at 11:09

## 2023-09-22 RX ADMIN — TRIAMCINOLONE ACETONIDE 40 MG: 40 INJECTION, SUSPENSION INTRA-ARTICULAR; INTRAMUSCULAR at 11:09

## 2023-09-22 NOTE — PROGRESS NOTES
[facility] Minor Surgery Note    ID:  Keri Sahni  MRN:  [id number]  9/22/2023    Chief Complaint:     History of Present Illness:  Keri Sahni is a 58 y.o. female who presents for intradermal kenalog injection for alopecia areata and cryotherapy of Sk's and acrochordons. Reports mild hypopigmentation of a patch on the forehead. Reports Sk lesions on back and right side of forehead itch.       Review of patient's allergies indicates:  No Known Allergies      Review of Systems:  As per HPI    Physical Exam:  Vitals:    09/22/23 1018   BP: 131/84   Pulse: 82   Resp: 18   Temp: 98.6 °F (37 °C)     Physical Exam  Constitutional:       Appearance: Normal appearance.   Skin:     Comments: Patchy hair loss to frontal scalp, general thinning   Sks on back, right side of frontal scalp  Acrochordons on left lateral neck  (Please see pictures)   Neurological:      Mental Status: She is alert.   Psychiatric:         Mood and Affect: Mood normal.         Behavior: Behavior normal.                             Procedure Note:  Procedure 1: Kenalog Intradermal/intralesional Injections  Performed by: Addison Parks MD  Supervised by: Michael Crabtree MD  Consent: signed consent obtained after discussion of risks, benefits, and alternative treatments  Description:  Head was cleaned with alcohol wipes. 1.5 cc of Triamcinolone was injected intradermally .1mL at a time across scalp on areas of patchiness. Blood loss minimal.   The patient tolerated the procedure well with no complications.      Procedure 2: Cryotherapy of Sks, acrochordons  Performed by: Addison Parks MD  Supervised by: Michael Crabtree  Consent: signed consent obtained after discussion of risks, benefits, and alternative treatments  Description:  Liquid nitrogen used for cryotherapy. Tip held 1 cm from lesion and sprayed in freeze-thaw cycle.   The patient tolerated the procedure well with no complications.      Assessment/Plan:  1. Alopecia areata    2. Achrochordon     3. SK (seborrheic keratosis)      - Intralesional steroid injection performed  - Sk's and acrochordons frozen  - see procedure notes above  - Post care instructions and return precautions discussed.      Follow up in about 4 weeks (around 10/20/2023).    Addison Parks MD, [facility] Family Medicine Resident, -

## 2023-09-29 ENCOUNTER — OFFICE VISIT (OUTPATIENT)
Dept: DERMATOLOGY | Facility: CLINIC | Age: 58
End: 2023-09-29
Payer: MEDICAID

## 2023-09-29 VITALS
HEART RATE: 92 BPM | TEMPERATURE: 98 F | BODY MASS INDEX: 39.59 KG/M2 | RESPIRATION RATE: 20 BRPM | WEIGHT: 196.38 LBS | HEIGHT: 59 IN | SYSTOLIC BLOOD PRESSURE: 150 MMHG | OXYGEN SATURATION: 99 % | DIASTOLIC BLOOD PRESSURE: 88 MMHG

## 2023-09-29 DIAGNOSIS — L30.9 ECZEMA, UNSPECIFIED TYPE: ICD-10-CM

## 2023-09-29 DIAGNOSIS — L63.9 ALOPECIA AREATA: ICD-10-CM

## 2023-09-29 PROCEDURE — 17003 DESTRUCT PREMALG LES 2-14: CPT | Mod: PBBFAC

## 2023-09-29 PROCEDURE — 99215 OFFICE O/P EST HI 40 MIN: CPT | Mod: PBBFAC | Performed by: DERMATOLOGY

## 2023-09-29 PROCEDURE — 17000 DESTRUCT PREMALG LESION: CPT | Mod: PBBFAC

## 2023-09-29 RX ORDER — KETOCONAZOLE 20 MG/ML
SHAMPOO, SUSPENSION TOPICAL
Qty: 120 ML | Refills: 1 | Status: SHIPPED | OUTPATIENT
Start: 2023-10-02

## 2023-09-29 RX ORDER — FLUOCINONIDE TOPICAL SOLUTION USP, 0.05% 0.5 MG/ML
SOLUTION TOPICAL 2 TIMES DAILY PRN
Qty: 60 ML | Refills: 1 | Status: SHIPPED | OUTPATIENT
Start: 2023-09-29

## 2023-09-29 RX ORDER — KETOCONAZOLE 20 MG/G
CREAM TOPICAL DAILY
Qty: 60 G | Refills: 1 | Status: SHIPPED | OUTPATIENT
Start: 2023-09-29

## 2023-09-29 NOTE — PROGRESS NOTES
Patient ID: Keri Sahni is a 58 y.o. female.    Chief Complaint: Follow-up (F/U ALOPECIA ,NO NEW COMPLAINTS)    Follow-up      58 yo F presents to derm clinic for follow-up    Referred to derm clinic for skin discoloration of face and hands as well as hair loss to central scalp and anterior hair line.   She also reports facial skin darkening and itchiness of her fingers.  Previously prescribed ketoconazole cream and Lidex for symptom control with resolution of itchiness and discoloration to hands and feet.  She has been receiving kenalog injections in minor procedures clinic with moderate improvement in Alopecia.  She continues to endorse pruritis over scalp area.  She previously received liquid nitrogen therapy to several moles and seborrheic keratosis present over her trunk and neck which have heeled well.  Has large mole over her right temple that remains present and is also endorsing lesion over central chest that she would like addressed.  Also endorsing itching present over her central back with some mild skin discoloration noted over her spine.  No other acute complaints at this time.      Denies systemic symptoms, joint disorders, or hx of autoimmune disease    Review of Systems  See above     Objective   Vitals:    09/29/23 0835   BP: (!) 150/88   Pulse: 92   Resp: 20   Temp: 98.4 °F (36.9 °C)     Physical Exam  Gen arti: NAD  Skin: loss of hair near central and frontal scalp area, skin darkening of face, moles present over entire trunk with one large mole present over her right temple.  Two seborrheic keratosis present over central chest.     Assessment and Plan     Problem List Items Addressed This Visit    None  Visit Diagnoses       Alopecia areata        Relevant Medications    ketoconazole (NIZORAL) 2 % shampoo (Start on 10/2/2023)    Eczema, unspecified type        Relevant Medications    fluocinonide (LIDEX) 0.05 % external solution          - Prescribed Lidex 0.05 % external solution 2  times daily PRN to scalp and other affected areas, continue Vaseline therapy and can try unscented lotion  - Advised patient to avoid sun or irritant for dx of melasma, counseled appropriately on this condition.   -continue ketoconazole shampoo   -continue follow-up in minor procedures clinic for continued kenalog injections.  She should receive these every 2 months for the next year. Will reevaluate at next clinic visit.   -liquid nitrogen therapy performed today to mole over patients temple as well as lesion present over her central chest.        RTC 1 year     Trisha Smalls MD  [facility], PGY-2

## 2023-09-29 NOTE — PROGRESS NOTES
Patient seen and examined with resident, agree with plan as outlined. Cryo to inflamed seborrheic keratoses of L temple and central chest      Evan Casillas MD  [facility]

## 2023-10-04 ENCOUNTER — ANESTHESIA EVENT (OUTPATIENT)
Dept: ENDOSCOPY | Facility: HOSPITAL | Age: 58
End: 2023-10-04
Payer: MEDICAID

## 2023-10-04 NOTE — ANESTHESIA PREPROCEDURE EVALUATION
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9 04/10/2023    HCT 40.0 04/10/2023     04/10/2023    CHOL 200 04/10/2023    TRIG 93 04/10/2023    HDL 48 04/10/2023    ALT 12 04/10/2023    AST 15 04/10/2023     04/10/2023    K 3.8 04/10/2023    CREATININE 0.79 04/10/2023    BUN 9.7 (L) 04/10/2023    CO2 28 04/10/2023    TSH 2.4621 03/24/2022    HGBA1C 5.2 04/10/2023     No current facility-administered medications on file prior to encounter.     Current Outpatient Medications on File Prior to Encounter   Medication Sig Dispense Refill    loratadine (CLARITIN) 10 mg tablet Take 1 tablet (10 mg total) by mouth once daily. 60 tablet 3    losartan (COZAAR) 25 MG tablet Take 1 tablet (25 mg total) by mouth once daily. 90 tablet 3    guaiFENesin (MUCINEX) 600 mg 12 hr tablet Take 600 mg by mouth.           Pre-op Assessment    I have reviewed the Patient Summary Reports.     I have reviewed the Nursing Notes. I have reviewed the NPO Status.   I have reviewed the Medications.      Review of Systems  Anesthesia Hx:  No problems with previous Anesthesia  History of prior surgery of interest to airway management or planning: Denies Family Hx of Anesthesia complications.   Denies Personal Hx of Anesthesia complications.   Hematology/Oncology:  Hematology Normal   Oncology Normal     EENT/Dental:EENT/Dental Normal   Cardiovascular:  Cardiovascular Normal     Pulmonary:  Pulmonary Normal    Renal/:  Renal/ Normal     Hepatic/GI:  Hepatic/GI Normal    Musculoskeletal:  Musculoskeletal Normal    Neurological:  Neurology Normal    Endocrine:  Endocrine Normal    Dermatological:  Skin Normal    Psych:  Psychiatric Normal           Physical Exam  General: Well nourished, Cooperative, Alert and Oriented    Airway:  Mallampati: I / I  Mouth Opening: Normal  TM Distance: Normal  Tongue: Normal  Neck ROM: Normal ROM    Dental:  Intact        Anesthesia Plan  Type of Anesthesia, risks & benefits discussed:    Anesthesia Type: Gen Natural Airway  Intra-op Monitoring Plan: Standard ASA Monitors  Post Op Pain Control Plan: IV/PO Opioids PRN  (medical reason for not using multimodal pain management)  Induction:  IV  Informed Consent: Informed consent signed with the Patient and all parties understand the risks and agree with anesthesia plan.  All questions answered. Patient consented to blood products? No  ASA Score: 3  Day of Surgery Review of History & Physical: H&P Update referred to the surgeon/provider.    Ready For Surgery From Anesthesia Perspective.     .

## 2023-10-06 ENCOUNTER — ANESTHESIA (OUTPATIENT)
Dept: ENDOSCOPY | Facility: HOSPITAL | Age: 58
End: 2023-10-06
Payer: MEDICAID

## 2023-10-06 ENCOUNTER — HOSPITAL ENCOUNTER (OUTPATIENT)
Facility: HOSPITAL | Age: 58
Discharge: HOME | End: 2023-10-06
Attending: COLON & RECTAL SURGERY | Admitting: COLON & RECTAL SURGERY
Payer: MEDICAID

## 2023-10-06 DIAGNOSIS — H50.812 DUANE'S SYNDROME OF BOTH EYES: Primary | ICD-10-CM

## 2023-10-06 DIAGNOSIS — H50.811 DUANE'S SYNDROME OF BOTH EYES: Primary | ICD-10-CM

## 2023-10-06 DIAGNOSIS — R19.5 POSITIVE OCCULT STOOL BLOOD TEST: ICD-10-CM

## 2023-10-06 PROCEDURE — 88305 TISSUE EXAM BY PATHOLOGIST: CPT | Mod: TC | Performed by: COLON & RECTAL SURGERY

## 2023-10-06 PROCEDURE — D9220A PRA ANESTHESIA: ICD-10-PCS | Mod: ,,, | Performed by: NURSE ANESTHETIST, CERTIFIED REGISTERED

## 2023-10-06 PROCEDURE — 63600175 PHARM REV CODE 636 W HCPCS: Performed by: NURSE ANESTHETIST, CERTIFIED REGISTERED

## 2023-10-06 PROCEDURE — 63600175 PHARM REV CODE 636 W HCPCS: Performed by: COLON & RECTAL SURGERY

## 2023-10-06 PROCEDURE — 37000008 HC ANESTHESIA 1ST 15 MINUTES: Performed by: COLON & RECTAL SURGERY

## 2023-10-06 PROCEDURE — D9220A PRA ANESTHESIA: Mod: ,,, | Performed by: NURSE ANESTHETIST, CERTIFIED REGISTERED

## 2023-10-06 PROCEDURE — 37000009 HC ANESTHESIA EA ADD 15 MINS: Performed by: COLON & RECTAL SURGERY

## 2023-10-06 PROCEDURE — 27201423 OPTIME MED/SURG SUP & DEVICES STERILE SUPPLY: Performed by: COLON & RECTAL SURGERY

## 2023-10-06 PROCEDURE — 25000003 PHARM REV CODE 250: Performed by: NURSE ANESTHETIST, CERTIFIED REGISTERED

## 2023-10-06 PROCEDURE — 45385 COLONOSCOPY W/LESION REMOVAL: CPT | Performed by: COLON & RECTAL SURGERY

## 2023-10-06 RX ORDER — SODIUM CHLORIDE 0.9 % (FLUSH) 0.9 %
10 SYRINGE (ML) INJECTION
Status: DISCONTINUED | OUTPATIENT
Start: 2023-10-06 | End: 2023-10-06 | Stop reason: HOSPADM

## 2023-10-06 RX ORDER — LIDOCAINE HYDROCHLORIDE 20 MG/ML
INJECTION, SOLUTION EPIDURAL; INFILTRATION; INTRACAUDAL; PERINEURAL
Status: DISCONTINUED | OUTPATIENT
Start: 2023-10-06 | End: 2023-10-06

## 2023-10-06 RX ORDER — SODIUM CHLORIDE, SODIUM LACTATE, POTASSIUM CHLORIDE, CALCIUM CHLORIDE 600; 310; 30; 20 MG/100ML; MG/100ML; MG/100ML; MG/100ML
INJECTION, SOLUTION INTRAVENOUS CONTINUOUS
Status: DISCONTINUED | OUTPATIENT
Start: 2023-10-06 | End: 2023-10-06 | Stop reason: HOSPADM

## 2023-10-06 RX ORDER — PROPOFOL 10 MG/ML
VIAL (ML) INTRAVENOUS
Status: DISCONTINUED | OUTPATIENT
Start: 2023-10-06 | End: 2023-10-06

## 2023-10-06 RX ADMIN — PROPOFOL 40 MG: 10 INJECTION, EMULSION INTRAVENOUS at 09:10

## 2023-10-06 RX ADMIN — SODIUM CHLORIDE, POTASSIUM CHLORIDE, SODIUM LACTATE AND CALCIUM CHLORIDE: 600; 310; 30; 20 INJECTION, SOLUTION INTRAVENOUS at 09:10

## 2023-10-06 RX ADMIN — PROPOFOL 50 MG: 10 INJECTION, EMULSION INTRAVENOUS at 09:10

## 2023-10-06 RX ADMIN — SODIUM CHLORIDE, POTASSIUM CHLORIDE, SODIUM LACTATE AND CALCIUM CHLORIDE: 600; 310; 30; 20 INJECTION, SOLUTION INTRAVENOUS at 08:10

## 2023-10-06 RX ADMIN — LIDOCAINE HYDROCHLORIDE 100 MG: 20 INJECTION, SOLUTION EPIDURAL; INFILTRATION; INTRACAUDAL; PERINEURAL at 09:10

## 2023-10-06 RX ADMIN — PROPOFOL 30 MG: 10 INJECTION, EMULSION INTRAVENOUS at 09:10

## 2023-10-06 RX ADMIN — PROPOFOL 20 MG: 10 INJECTION, EMULSION INTRAVENOUS at 09:10

## 2023-10-06 RX ADMIN — PROPOFOL 90 MG: 10 INJECTION, EMULSION INTRAVENOUS at 09:10

## 2023-10-06 NOTE — ANESTHESIA POSTPROCEDURE EVALUATION
Anesthesia Post Evaluation    Patient: Keri Sahni    Procedure(s) Performed: Procedure(s) (LRB):  COLONOSCOPY (N/A)    Final Anesthesia Type: general      Patient location during evaluation: GI PACU  Patient participation: Yes- Able to Participate  Level of consciousness: awake and alert  Post-procedure vital signs: reviewed and stable  Pain management: adequate  Airway patency: patent    PONV status at discharge: No PONV  Anesthetic complications: no      Cardiovascular status: hemodynamically stable  Respiratory status: spontaneous ventilation  Hydration status: euvolemic  Follow-up not needed.          Vitals Value Taken Time   /104 10/06/23 0807   Temp 37 °C (98.6 °F) 10/06/23 0807   Pulse 82 10/06/23 0807   Resp 20 10/06/23 0807   SpO2 95 % 10/06/23 0807         No case tracking events are documented in the log.      Pain/Shantel Score: No data recorded

## 2023-10-06 NOTE — PROVATION PATIENT INSTRUCTIONS
Discharge Summary/Instructions after an Endoscopic Procedure  Patient Name: Keri Sahni  Patient MRN: [id number]  Patient YOB: 1965 Friday, October 6, 2023  Juan Vyas MD  Dear patient,  As a result of recent federal legislation (The Federal Cures Act), you may   receive lab or pathology results from your procedure in your [facility]   account before your physician is able to contact you. Your physician or   their representative will relay the results to you with their   recommendations at their soonest availability.  Thank you,  RESTRICTIONS:  During your procedure today, you received medications for sedation.  These   medications may affect your judgment, balance and coordination.  Therefore,   for 24 hours, you have the following restrictions:   - DO NOT drive a car, operate machinery, make legal/financial decisions,   sign important papers or drink alcohol.    ACTIVITY:  Today: no heavy lifting, straining or running due to procedural   sedation/anesthesia.  The following day: return to full activity including work.  DIET:  Eat and drink normally unless instructed otherwise.     TREATMENT FOR COMMON SIDE EFFECTS:  - Mild abdominal pain, nausea, belching, bloating or excessive gas:  rest,   eat lightly and use a heating pad.  - Sore Throat: treat with throat lozenges and/or gargle with warm salt   water.  - Because air was used during the procedure, expelling large amounts of air   from your rectum or belching is normal.  - If a bowel prep was taken, you may not have a bowel movement for 1-3 days.    This is normal.  SYMPTOMS TO WATCH FOR AND REPORT TO YOUR PHYSICIAN:  1. Abdominal pain or bloating, other than gas cramps.  2. Chest pain.  3. Back pain.  4. Signs of infection such as: chills or fever occurring within 24 hours   after the procedure.  5. Rectal bleeding, which would show as bright red, maroon, or black stools.   (A tablespoon of blood from the rectum is not serious, especially  if   hemorrhoids are present.)  6. Vomiting.  7. Weakness or dizziness.  GO DIRECTLY TO THE NEAREST EMERGENCY ROOM IF YOU HAVE ANY OF THE FOLLOWING:      Difficulty breathing              Chills and/or fever over 101 F   Persistent vomiting and/or vomiting blood   Severe abdominal pain   Severe chest pain   Black, tarry stools   Bleeding- more than one tablespoon   Any other symptom or condition that you feel may need urgent attention  Your doctor recommends these additional instructions:  If any biopsies were taken, your doctors clinic will contact you in 1 to 2   weeks with any results.  - Discharge patient to home.   - Resume previous diet.   - Continue present medications.   - Repeat colonoscopy in 5 years for surveillance.  For questions, problems or results please call your physician - Juan Vyas MD at Work:  [phone].  [facility] , EMERGENCY ROOM PHONE NUMBER: [phone]  IF A COMPLICATION OR EMERGENCY SITUATION ARISES AND YOU ARE UNABLE TO REACH   YOUR PHYSICIAN - GO DIRECTLY TO THE EMERGENCY ROOM.  MD Juan Larson MD  10/6/2023 10:00:58 AM  This report has been verified and signed electronically.  Dear patient,  As a result of recent federal legislation (The Federal Cures Act), you may   receive lab or pathology results from your procedure in your [facility]   account before your physician is able to contact you. Your physician or   their representative will relay the results to you with their   recommendations at their soonest availability.  Thank you,  PROVATION

## 2023-10-06 NOTE — TRANSFER OF CARE
Anesthesia Transfer of Care Note    Patient: Keri Sahni    Procedure(s) Performed: Procedure(s) (LRB):  COLONOSCOPY (N/A)    Patient location: GI    Anesthesia Type: general    Post pain: adequate analgesia    Post assessment: no apparent anesthetic complications    Post vital signs: stable    Level of consciousness: sedated    Nausea/Vomiting: no nausea/vomiting    Complications: none    Transfer of care protocol was followed      Last vitals:   Visit Vitals  BP (!) 176/104 (BP Location: Left arm, Patient Position: Lying)   Pulse 82   Temp 37 °C (98.6 °F) (Oral)   Resp 20   SpO2 95%   Breastfeeding No

## 2023-10-06 NOTE — H&P
Colonoscopy History and Physical    Patient Name: Keri Sahni  MRN: [id number]  : 1965  Date of Procedure:  10/6/2023  Referring Physician: Addison Hardin MD  Primary Physician: Will Rodriguez MD  Procedure Physician: Paulina Norton MD, MPH     Procedure - Colonoscopy  ASA - per anesthesia  Mallampati - per anesthesia  History of Anesthesia problems - no  Family history Anesthesia problems -  no   Plan of anesthesia - General    Diagnosis: screening for colon cancer  Chief Complaint: Same as above    HPI: Patient is an 58 y.o. female is here for the above. Denies weight loss, abdominal pain, or bleeding. Last colonoscopy in .     Last colonoscopy: - polyps found   Family history: none  Anticoagulation: none    ROS:  Constitutional: No fevers, chills, No weight loss  CV: No chest pain  Pulm: No cough, No shortness of breath  GI: see HPI    Medical History:   Past Medical History:   Diagnosis Date    Back injury     Benign essential HTN     Depression     Insomnia     Knee pain     Obesity     Pregnancy     Sinusitis          Surgical History:   Past Surgical History:   Procedure Laterality Date    APPENDECTOMY      CATARACT EXTRACTION Left 2021    CATARACT EXTRACTION Right 2020    HEEL SPUR SURGERY      HYSTERECTOMY      KNEE SURGERY      NECK SURGERY      TONSILLECTOMY         Family History:   Family History   Problem Relation Age of Onset    Diabetes Mother     Hypertension Mother     Diabetes Father     Cataracts Father     Glaucoma Father     Hypertension Father     Cataracts Sister     Glaucoma Sister     Hypertension Sister     Stroke Sister     Thyroid disease Sister     No Known Problems Brother     No Known Problems Maternal Aunt     Cancer Maternal Uncle     No Known Problems Paternal Aunt     No Known Problems Paternal Uncle     Blindness Maternal Grandmother     Glaucoma Maternal Grandmother     No Known Problems Maternal Grandfather     No Known Problems Paternal  Grandmother     No Known Problems Paternal Grandfather     Stroke Cousin     Amblyopia Neg Hx     Macular degeneration Neg Hx     Retinal detachment Neg Hx     Strabismus Neg Hx    .    Social History:   Social History     Socioeconomic History    Marital status:    Tobacco Use    Smoking status: Former     Types: Cigarettes    Smokeless tobacco: Never   Substance and Sexual Activity    Alcohol use: Not Currently    Drug use: Never       Review of patient's allergies indicates:  No Known Allergies    Medications:   Medications Prior to Admission   Medication Sig Dispense Refill Last Dose    albuterol (PROVENTIL/VENTOLIN HFA) 90 mcg/actuation inhaler See Instructions, INHALE 2 PUFFS BY MOUTH EVERY 6 HOURS AS NEEDED FOR WHEEZING, # 2 EA, 6 Refill(s), Pharmacy: [id number], 159, cm, Height/Length Dosing, 09/01/21 14:36:00 CDT, 90.5, kg, Weight Dosing, 09/01/21 14:36:00 CDT 18 g 3 Past Week    amLODIPine (NORVASC) 5 MG tablet Take 5 mg by mouth.   10/5/2023    baclofen (LIORESAL) 10 MG tablet Take 10 mg by mouth 2 (two) times daily.   10/5/2023    betamethasone dipropionate 0.05 % cream Apply topically 2 (two) times daily.   10/5/2023    busPIRone (BUSPAR) 5 MG Tab Take 5 mg by mouth 3 (three) times daily.   10/5/2023    celecoxib (CELEBREX) 200 MG capsule Take 200 mg by mouth.   10/5/2023    DULoxetine (CYMBALTA) 60 MG capsule Take 120 mg by mouth.   10/5/2023    fluticasone propionate (FLONASE) 50 mcg/actuation nasal spray 1 spray (50 mcg total) by Each Nostril route once daily. 11.1 mL 1 10/5/2023    gabapentin (NEURONTIN) 400 MG capsule Take 400 mg by mouth 3 (three) times daily.   10/5/2023    guaiFENesin (MUCINEX) 600 mg 12 hr tablet Take 600 mg by mouth.   Past Week    HYDROcodone-acetaminophen (NORCO)  mg per tablet Take by mouth.   10/5/2023    ketoconazole (NIZORAL) 2 % cream Apply topically once daily. Apply for 4 weeks only 60 g 1 10/5/2023    ketoconazole (NIZORAL) 2 % shampoo Apply  "topically twice a week. 120 mL 1 Past Month    loratadine (CLARITIN) 10 mg tablet Take 1 tablet (10 mg total) by mouth once daily. 60 tablet 3 10/5/2023    losartan (COZAAR) 25 MG tablet Take 1 tablet (25 mg total) by mouth once daily. 90 tablet 3 10/6/2023    polyethylene glycol (MOVIPREP) 100-7.5-2.691 gram solution Take as directed per instructions provided by GI Clinic 1 kit 0 10/6/2023    traZODone (DESYREL) 100 MG tablet Take 100 mg by mouth every evening.   10/5/2023    fluocinonide (LIDEX) 0.05 % external solution Apply topically 2 (two) times daily as needed (Itching). 60 mL 1 Unknown    promethazine-dextromethorphan (PROMETHAZINE-DM) 6.25-15 mg/5 mL Syrp Take 5 mLs by mouth every 6 to 8 hours as needed (cough). May cause sedation.  Do not drive or operate heavy machinery. (Patient not taking: Reported on 9/27/2023) 60 mL 0 Not Taking         Physical Exam:    Vital Signs:   Vitals:    10/06/23 0807   BP: (!) 176/104   Pulse: 82   Resp: 20   Temp: 98.6 °F (37 °C)     BP (!) 176/104 (BP Location: Left arm, Patient Position: Lying)   Pulse 82   Temp 98.6 °F (37 °C) (Oral)   Resp 20   SpO2 95%   Breastfeeding No     General:          Well appearing in no acute distress  Lungs: Respirations unlabored  Heart: Regular rate and rhythm  Abdomen:         Soft, non-tender, bowel sounds normal, no masses, no organomegaly        Labs:  Lab Results   Component Value Date    WBC 9.1 04/10/2023    HGB 13.9 04/10/2023    HCT 40.0 04/10/2023    MCV 79.4 (L) 04/10/2023     04/10/2023     No results found for: "INR", "PT", "APTT"  Lab Results   Component Value Date     04/10/2023    K 3.8 04/10/2023    CO2 28 04/10/2023    BUN 9.7 (L) 04/10/2023    CREATININE 0.79 04/10/2023    LABPROT 7.6 04/10/2023    ALBUMIN 4.4 04/10/2023    BILITOT 0.5 04/10/2023    ALKPHOS 74 04/10/2023    ALT 12 04/10/2023    AST 15 04/10/2023         Assessment and Plan:  58F here for screening colonoscopy    History reviewed, vital " signs satisfactory, cardiopulmonary status satisfactory.  I have explained the sedation options, risks, benefits, and alternatives of this endoscopic procedure to the patient including but not limited to bleeding, inflammation, infection, perforation, and death.  All questions were answered and the patient consented to proceed with procedure as planned.   The patient is deemed an appropriate candidate for the sedation as planned.      Natalia Diallo MD  [employer] [facility] PGY2    10/6/2023  8:31 AM

## 2023-10-09 VITALS
DIASTOLIC BLOOD PRESSURE: 100 MMHG | RESPIRATION RATE: 18 BRPM | SYSTOLIC BLOOD PRESSURE: 159 MMHG | HEART RATE: 73 BPM | TEMPERATURE: 99 F | OXYGEN SATURATION: 98 %

## 2023-10-11 LAB
ESTROGEN SERPL-MCNC: NORMAL PG/ML
INSULIN SERPL-ACNC: NORMAL U[IU]/ML
LAB AP CLINICAL INFORMATION: NORMAL
LAB AP GROSS DESCRIPTION: NORMAL
LAB AP REPORT FOOTNOTES: NORMAL
T3RU NFR SERPL: NORMAL %

## 2023-10-19 ENCOUNTER — OFFICE VISIT (OUTPATIENT)
Dept: GYNECOLOGY | Facility: CLINIC | Age: 58
End: 2023-10-19
Payer: MEDICAID

## 2023-10-19 VITALS
WEIGHT: 195 LBS | DIASTOLIC BLOOD PRESSURE: 82 MMHG | SYSTOLIC BLOOD PRESSURE: 129 MMHG | HEART RATE: 74 BPM | BODY MASS INDEX: 39.31 KG/M2 | RESPIRATION RATE: 18 BRPM | TEMPERATURE: 99 F | OXYGEN SATURATION: 100 % | HEIGHT: 59 IN

## 2023-10-19 DIAGNOSIS — Z12.31 SCREENING MAMMOGRAM FOR BREAST CANCER: ICD-10-CM

## 2023-10-19 DIAGNOSIS — Z01.419 WOMEN'S ANNUAL ROUTINE GYNECOLOGICAL EXAMINATION: Primary | ICD-10-CM

## 2023-10-19 DIAGNOSIS — L02.91 ABSCESS: ICD-10-CM

## 2023-10-19 PROCEDURE — 99396 PREV VISIT EST AGE 40-64: CPT | Mod: S$PBB,,, | Performed by: NURSE PRACTITIONER

## 2023-10-19 PROCEDURE — 3008F BODY MASS INDEX DOCD: CPT | Mod: CPTII,,, | Performed by: NURSE PRACTITIONER

## 2023-10-19 PROCEDURE — 3079F DIAST BP 80-89 MM HG: CPT | Mod: CPTII,,, | Performed by: NURSE PRACTITIONER

## 2023-10-19 PROCEDURE — 4010F ACE/ARB THERAPY RXD/TAKEN: CPT | Mod: CPTII,,, | Performed by: NURSE PRACTITIONER

## 2023-10-19 PROCEDURE — 87624 HPV HI-RISK TYP POOLED RSLT: CPT

## 2023-10-19 PROCEDURE — 1160F RVW MEDS BY RX/DR IN RCRD: CPT | Mod: CPTII,,, | Performed by: NURSE PRACTITIONER

## 2023-10-19 PROCEDURE — 1160F PR REVIEW ALL MEDS BY PRESCRIBER/CLIN PHARMACIST DOCUMENTED: ICD-10-PCS | Mod: CPTII,,, | Performed by: NURSE PRACTITIONER

## 2023-10-19 PROCEDURE — 99396 PR PREVENTIVE VISIT,EST,40-64: ICD-10-PCS | Mod: S$PBB,,, | Performed by: NURSE PRACTITIONER

## 2023-10-19 PROCEDURE — 3079F PR MOST RECENT DIASTOLIC BLOOD PRESSURE 80-89 MM HG: ICD-10-PCS | Mod: CPTII,,, | Performed by: NURSE PRACTITIONER

## 2023-10-19 PROCEDURE — 1159F PR MEDICATION LIST DOCUMENTED IN MEDICAL RECORD: ICD-10-PCS | Mod: CPTII,,, | Performed by: NURSE PRACTITIONER

## 2023-10-19 PROCEDURE — 3074F PR MOST RECENT SYSTOLIC BLOOD PRESSURE < 130 MM HG: ICD-10-PCS | Mod: CPTII,,, | Performed by: NURSE PRACTITIONER

## 2023-10-19 PROCEDURE — 3008F PR BODY MASS INDEX (BMI) DOCUMENTED: ICD-10-PCS | Mod: CPTII,,, | Performed by: NURSE PRACTITIONER

## 2023-10-19 PROCEDURE — 3044F PR MOST RECENT HEMOGLOBIN A1C LEVEL <7.0%: ICD-10-PCS | Mod: CPTII,,, | Performed by: NURSE PRACTITIONER

## 2023-10-19 PROCEDURE — 88174 CYTOPATH C/V AUTO IN FLUID: CPT | Performed by: NURSE PRACTITIONER

## 2023-10-19 PROCEDURE — 3044F HG A1C LEVEL LT 7.0%: CPT | Mod: CPTII,,, | Performed by: NURSE PRACTITIONER

## 2023-10-19 PROCEDURE — 3074F SYST BP LT 130 MM HG: CPT | Mod: CPTII,,, | Performed by: NURSE PRACTITIONER

## 2023-10-19 PROCEDURE — 99215 OFFICE O/P EST HI 40 MIN: CPT | Mod: PBBFAC | Performed by: NURSE PRACTITIONER

## 2023-10-19 PROCEDURE — 4010F PR ACE/ARB THEARPY RXD/TAKEN: ICD-10-PCS | Mod: CPTII,,, | Performed by: NURSE PRACTITIONER

## 2023-10-19 PROCEDURE — 1159F MED LIST DOCD IN RCRD: CPT | Mod: CPTII,,, | Performed by: NURSE PRACTITIONER

## 2023-10-19 RX ORDER — SULFAMETHOXAZOLE AND TRIMETHOPRIM 800; 160 MG/1; MG/1
1 TABLET ORAL 2 TIMES DAILY
Qty: 10 TABLET | Refills: 0 | Status: SHIPPED | OUTPATIENT
Start: 2023-10-19 | End: 2023-10-24 | Stop reason: SDUPTHER

## 2023-10-19 NOTE — PROGRESS NOTES
"Patient ID: Keri Sahni is a 58 y.o. female.    Chief Complaint: Annual Exam      Review of patient's allergies indicates:  No Known Allergies          HPI:  .   Pt is  here for annual gyn. Denies hx of abnormal pap smear. Pt with hx of supracervical hysterectomy with BSO secondary to AUB-L. Denies vaginal bleeding or discharge. Denies pain. States is not sexually active. Pt c/o bump to mons pubis.Denies fever/drainage. Last pap 2020=NIL; HPV negative.  Review of Systems:   Negative except for findings in HPI     Objective:   /82   Pulse 74   Temp 98.7 °F (37.1 °C) (Oral)   Resp 18   Ht 4' 11" (1.499 m)   Wt 88.5 kg (195 lb)   SpO2 100%   BMI 39.39 kg/m²    Physical Exam:  GENERAL: Pt is aware and alert and  in no acute distress.  BREASTS: Bilateral-No masses, nipple discharge, skin changes, or tenderness.  ABDOMEN: Soft, non tender.  VULVA:  approx 1cm area of induration to mons pubis; erythematous; mild tenderness; no drainage  URETHRA: No lesions  BLADDER: No tenderness.  VAGINA: Mucosa normal,no discharge; no lesions.  CERVIX:  no CMT, NO discharge; nabothian cyst 11 loclock; friable cervix  BIMANUAL EXAM:  The uterus is absent Praveen adnexa reveal no evidence of masses; no fullness   SKIN: Warm and Dry  PSYCHIATRIC: Patient is awake and alert. Mood and affect are normal.    Assessment:   Women's annual routine gynecological examination  -     Liquid-Based Pap Smear, Screening Screening    Screening mammogram for breast cancer  -     Mammo Digital Screening Bilat w/ Alex; Future; Expected date: 2023    Abscess  -     sulfamethoxazole-trimethoprim 800-160mg (BACTRIM DS) 800-160 mg Tab; Take 1 tablet by mouth 2 (two) times daily. for 7 days  Dispense: 10 tablet; Refill: 0            1. Women's annual routine gynecological examination    2. Screening mammogram for breast cancer    3. Abscess             -warm compresses to abscess; complete antibiotics; notify clinic if no resolution or " worsening symptoms  Plan:       Follow up in about 1 year (around 10/19/2024).

## 2023-10-20 ENCOUNTER — OFFICE VISIT (OUTPATIENT)
Dept: FAMILY MEDICINE | Facility: CLINIC | Age: 58
End: 2023-10-20
Payer: MEDICAID

## 2023-10-20 VITALS
OXYGEN SATURATION: 99 % | HEIGHT: 59 IN | RESPIRATION RATE: 18 BRPM | DIASTOLIC BLOOD PRESSURE: 82 MMHG | TEMPERATURE: 99 F | BODY MASS INDEX: 39.31 KG/M2 | HEART RATE: 72 BPM | SYSTOLIC BLOOD PRESSURE: 129 MMHG | WEIGHT: 195 LBS

## 2023-10-20 DIAGNOSIS — L63.9 ALOPECIA AREATA: Primary | ICD-10-CM

## 2023-10-20 PROCEDURE — 99213 OFFICE O/P EST LOW 20 MIN: CPT | Mod: PBBFAC

## 2023-10-20 PROCEDURE — 96372 THER/PROPH/DIAG INJ SC/IM: CPT | Mod: PBBFAC

## 2023-10-20 RX ORDER — TRIAMCINOLONE ACETONIDE 40 MG/ML
40 INJECTION, SUSPENSION INTRA-ARTICULAR; INTRAMUSCULAR
Status: DISCONTINUED | OUTPATIENT
Start: 2023-10-20 | End: 2023-10-20

## 2023-10-20 RX ORDER — LIDOCAINE HYDROCHLORIDE 10 MG/ML
5 INJECTION, SOLUTION EPIDURAL; INFILTRATION; INTRACAUDAL; PERINEURAL
Status: COMPLETED | OUTPATIENT
Start: 2023-10-20 | End: 2023-10-20

## 2023-10-20 RX ORDER — LIDOCAINE HYDROCHLORIDE 10 MG/ML
3 INJECTION, SOLUTION EPIDURAL; INFILTRATION; INTRACAUDAL; PERINEURAL
Status: DISCONTINUED | OUTPATIENT
Start: 2023-10-20 | End: 2023-10-20

## 2023-10-20 RX ORDER — TRIAMCINOLONE ACETONIDE 40 MG/ML
40 INJECTION, SUSPENSION INTRA-ARTICULAR; INTRAMUSCULAR ONCE
Status: COMPLETED | OUTPATIENT
Start: 2023-10-20 | End: 2023-10-20

## 2023-10-20 RX ADMIN — LIDOCAINE HYDROCHLORIDE 50 MG: 10 INJECTION, SOLUTION EPIDURAL; INFILTRATION; INTRACAUDAL; PERINEURAL at 10:10

## 2023-10-20 RX ADMIN — TRIAMCINOLONE ACETONIDE 40 MG: 40 INJECTION, SUSPENSION INTRA-ARTICULAR; INTRAMUSCULAR at 10:10

## 2023-10-20 NOTE — PROGRESS NOTES
Subjective:       Patient ID: Keri Sahni is a 58 y.o. female.    Chief Complaint: hair loss    HPI  Keri Sahni is a 58 y.o. female who presents for intradermal kenalog injections for alopecia areata.   Reports some improvement in hair with new growth. Has been tolerating injections and wishes to continue.    Review of Systems    Negative except as above.  Objective:      Vitals:    10/20/23 1010   BP: 129/82   Pulse: 72   Resp: 18   Temp: 98.6 °F (37 °C)       Physical Exam    Skin/scalp: Patchy hair loss to frontal scalp, general thinning. New growth of fine, gray hairs along frontal forehead.     Procedure Note:  Procedure 1: Kenalog with Lidocaine 1% Intradermal/intralesional Injections  Performed by: Gamal Verde MD  Supervised by: Michael Crabtree MD  Consent: signed consent obtained after discussion of risks, benefits, and alternative treatments  Description:  Head was cleaned with alcohol wipes. 1.5 cc of Triamcinolone was injected intradermally .1mL at a time across scalp on areas of patchiness (left and right temporal areas and crown). Blood loss minimal.   The patient tolerated the procedure well with no complications.         Assessment:       1. Alopecia areata        Plan:   - Intralesional steroid injection performed.  - Post care instructions provided.      RTC as needed for repeat injections.    Gamal Verde MD, MPH  [facility]

## 2023-10-24 ENCOUNTER — TELEPHONE (OUTPATIENT)
Dept: GYNECOLOGY | Facility: CLINIC | Age: 58
End: 2023-10-24
Payer: MEDICAID

## 2023-10-24 ENCOUNTER — OFFICE VISIT (OUTPATIENT)
Dept: INTERNAL MEDICINE | Facility: CLINIC | Age: 58
End: 2023-10-24
Payer: MEDICAID

## 2023-10-24 VITALS
HEIGHT: 59 IN | RESPIRATION RATE: 18 BRPM | OXYGEN SATURATION: 97 % | TEMPERATURE: 99 F | HEART RATE: 85 BPM | BODY MASS INDEX: 39.85 KG/M2 | WEIGHT: 197.69 LBS | SYSTOLIC BLOOD PRESSURE: 125 MMHG | DIASTOLIC BLOOD PRESSURE: 85 MMHG

## 2023-10-24 DIAGNOSIS — E66.01 CLASS 3 SEVERE OBESITY DUE TO EXCESS CALORIES WITH SERIOUS COMORBIDITY AND BODY MASS INDEX (BMI) OF 40.0 TO 44.9 IN ADULT: Primary | ICD-10-CM

## 2023-10-24 DIAGNOSIS — R60.0 LOWER EXTREMITY EDEMA: ICD-10-CM

## 2023-10-24 DIAGNOSIS — L02.91 ABSCESS: ICD-10-CM

## 2023-10-24 DIAGNOSIS — J45.909 ASTHMA DUE TO SEASONAL ALLERGIES: ICD-10-CM

## 2023-10-24 LAB
HBA1C MFR BLD: 5.9 %
PSYCHE PATHOLOGY RESULT: NORMAL

## 2023-10-24 PROCEDURE — 83036 HEMOGLOBIN GLYCOSYLATED A1C: CPT | Mod: PBBFAC

## 2023-10-24 PROCEDURE — 99215 OFFICE O/P EST HI 40 MIN: CPT | Mod: PBBFAC

## 2023-10-24 RX ORDER — LOSARTAN POTASSIUM 50 MG/1
50 TABLET ORAL DAILY
Qty: 90 TABLET | Refills: 3 | Status: SHIPPED | OUTPATIENT
Start: 2023-10-24 | End: 2024-10-23

## 2023-10-24 RX ORDER — LORATADINE 10 MG/1
10 TABLET ORAL DAILY
Qty: 60 TABLET | Refills: 3 | Status: SHIPPED | OUTPATIENT
Start: 2023-10-24 | End: 2023-10-24

## 2023-10-24 RX ORDER — SULFAMETHOXAZOLE AND TRIMETHOPRIM 800; 160 MG/1; MG/1
1 TABLET ORAL 2 TIMES DAILY
Qty: 4 TABLET | Refills: 0 | Status: SHIPPED | OUTPATIENT
Start: 2023-10-24 | End: 2023-10-26

## 2023-10-24 RX ORDER — LORATADINE 10 MG/1
10 TABLET ORAL DAILY PRN
Qty: 30 TABLET | Refills: 1 | Status: SHIPPED | OUTPATIENT
Start: 2023-10-24

## 2023-10-24 NOTE — PROGRESS NOTES
I have reviewed and concur with the resident's history, physical, assessment, and plan.  I have discussed with him all issues related to the diagnosis, workup and treatment plan. Care provided as reasonable and necessary.Obesity. loose weight    Nicola Mathew MD  [facility]

## 2023-10-24 NOTE — PROGRESS NOTES
[facility] INTERNAL MEDICINE CLINIC NOTE    Patient Name: Keri Sahni  YOB: 1965   MRN: [id number]  Appointment Date: 10/24/2023  AppointmentTime: 07:55 AM    Subjective     HPI: Keri Sahni is a 58 y.o.  female with PMH positive for obesity, allergies, alopecia areata, abnormal uterine bleeding s/p supracervical hysterectomy with BSO, anxiety, depression, who presented to IM clinic today for follow up visit. Patient reports she has been experiencing clear, runny discharge from nasal sinuses for the past several weeks that relief from Flonase. Requesting loratadine refill today.  She also has several questions regarding her inability to lose weight.  Discussed DASH diet and other dietary changes at length.  Patient also reports recently seen for Ob/gyn at which time a pubic boil was noted on exam and she was prescribed Bactrim b.i.d. which she reports she is taking as prescribed. Denies any other acute complaints.    Past Medical History:  Past Medical History:   Diagnosis Date    Back injury     Benign essential HTN     Depression     Insomnia     Knee pain     Obesity     Pregnancy     Sinusitis      Past Surgical History:  Past Surgical History:   Procedure Laterality Date    APPENDECTOMY      CATARACT EXTRACTION Left 04/01/2021    CATARACT EXTRACTION Right 11/19/2020    COLONOSCOPY, WITH POLYPECTOMY USING SNARE N/A 10/6/2023    Procedure: COLONOSCOPY, WITH POLYPECTOMY USING SNARE;  Surgeon: Juan Vyas MD;  Location: [facility] ENDOSCOPY;  Service: General;  Laterality: N/A;  hot    HEEL SPUR SURGERY      HYSTERECTOMY      KNEE SURGERY      NECK SURGERY      TONSILLECTOMY       Family History:  Family History   Problem Relation Age of Onset    No Known Problems Paternal Grandfather     No Known Problems Paternal Grandmother     Blindness Maternal Grandmother     Glaucoma Maternal Grandmother     No Known Problems Maternal Grandfather     Diabetes Father     Cataracts Father      Glaucoma Father     Hypertension Father     Diabetes Mother     Hypertension Mother     No Known Problems Brother     Cataracts Sister     Glaucoma Sister     Hypertension Sister     Stroke Sister     Thyroid disease Sister     No Known Problems Maternal Aunt     Prostate cancer Maternal Uncle     No Known Problems Paternal Aunt     No Known Problems Paternal Uncle     Stroke Cousin     Amblyopia Neg Hx     Macular degeneration Neg Hx     Retinal detachment Neg Hx     Strabismus Neg Hx      Social History:  Social History     Tobacco Use    Smoking status: Former     Current packs/day: 0.00     Types: Cigarettes     Quit date: 10/1/2016     Years since quittin.0     Passive exposure: Past    Smokeless tobacco: Former     Quit date: 10/1/2016   Substance Use Topics    Alcohol use: Not Currently    Drug use: Never     Allergies:  Review of patient's allergies indicates:  No Known Allergies  Home Medications:  Prior to Admission medications    Medication Sig Start Date End Date Taking? Authorizing Provider   albuterol (PROVENTIL/VENTOLIN HFA) 90 mcg/actuation inhaler See Instructions, INHALE 2 PUFFS BY MOUTH EVERY 6 HOURS AS NEEDED FOR WHEEZING, # 2 EA, 6 Refill(s), Pharmacy: [id number], 159, cm, Height/Length Dosing, 21 14:36:00 CDT, 90.5, kg, Weight Dosing, 21 14:36:00 CDT 23   Will Rodriguez MD   amLODIPine (NORVASC) 5 MG tablet Take 5 mg by mouth. 23   Provider, Historical   baclofen (LIORESAL) 10 MG tablet Take 10 mg by mouth 2 (two) times daily. 23   Provider, Historical   betamethasone dipropionate 0.05 % cream Apply topically 2 (two) times daily.    Provider, Historical   busPIRone (BUSPAR) 5 MG Tab Take 5 mg by mouth 3 (three) times daily. 23   Provider, Historical   celecoxib (CELEBREX) 200 MG capsule Take 200 mg by mouth. 23   Provider, Historical   DULoxetine (CYMBALTA) 60 MG capsule Take 120 mg by mouth. 23   Provider, Historical   fluocinonide  (LIDEX) 0.05 % external solution Apply topically 2 (two) times daily as needed (Itching). 9/29/23   Trisha Smalls MD   fluticasone propionate (FLONASE) 50 mcg/actuation nasal spray 1 spray (50 mcg total) by Each Nostril route once daily. 8/29/23   Will Rodriguez MD   gabapentin (NEURONTIN) 400 MG capsule Take 400 mg by mouth 3 (three) times daily. 8/22/23   Provider, Historical   guaiFENesin (MUCINEX) 600 mg 12 hr tablet Take 600 mg by mouth. 8/4/21   Provider, Historical   HYDROcodone-acetaminophen (NORCO)  mg per tablet Take by mouth. 8/22/23   Provider, Historical   ketoconazole (NIZORAL) 2 % cream Apply topically once daily. Apply for 4 weeks only 9/29/23   Trisha Smalls MD   ketoconazole (NIZORAL) 2 % shampoo Apply topically twice a week. 10/2/23   Trisha Smalls MD   loratadine (CLARITIN) 10 mg tablet Take 1 tablet (10 mg total) by mouth once daily. 1/17/23   Trisha Smalls MD   losartan (COZAAR) 25 MG tablet Take 1 tablet (25 mg total) by mouth once daily. 1/17/23 1/17/24  Trisha Smalls MD   polyethylene glycol (MOVIPREP) 100-7.5-2.691 gram solution Take as directed per instructions provided by GI Clinic  Patient not taking: Reported on 10/19/2023 9/8/23   Francesca Rg FNP   promethazine-dextromethorphan (PROMETHAZINE-DM) 6.25-15 mg/5 mL Syrp Take 5 mLs by mouth every 6 to 8 hours as needed (cough). May cause sedation.  Do not drive or operate heavy machinery.  Patient not taking: Reported on 9/27/2023 6/12/23   Omar Marsh MD   sulfamethoxazole-trimethoprim 800-160mg (BACTRIM DS) 800-160 mg Tab Take 1 tablet by mouth 2 (two) times daily. for 7 days 10/19/23 10/26/23  Deb Levine FNP   traZODone (DESYREL) 100 MG tablet Take 100 mg by mouth every evening. 8/30/23   Provider, Historical     Review of Systems:  Review of Systems   Constitutional:  Negative for chills, diaphoresis, fatigue and fever.   HENT:  Positive for rhinorrhea. Negative for ear pain, hearing loss, sore  throat, tinnitus and trouble swallowing.    Eyes:  Negative for pain, redness and visual disturbance.   Respiratory:  Negative for cough, chest tightness and shortness of breath.    Cardiovascular:  Negative for chest pain and palpitations.   Gastrointestinal:  Negative for abdominal pain, constipation, diarrhea, nausea and vomiting.   Genitourinary:  Negative for difficulty urinating, dysuria, frequency, hematuria and urgency.   Musculoskeletal:  Negative for arthralgias and myalgias.   Skin:  Negative for rash and wound.   Neurological:  Negative for dizziness, seizures, syncope, weakness, light-headedness, numbness and headaches.   Psychiatric/Behavioral:  Negative for confusion.        Objective     Vitals:   Vitals:    10/24/23 0836   BP: 125/85   Pulse: 85   Resp: 18   Temp: 98.9 °F (37.2 °C)       Physical Examination:   Physical Exam  Vitals reviewed.   Constitutional:       General: She is not in acute distress.     Appearance: Normal appearance. She is obese. She is not ill-appearing, toxic-appearing or diaphoretic.   HENT:      Head: Normocephalic and atraumatic.      Right Ear: External ear normal.      Left Ear: External ear normal.   Eyes:      General: No scleral icterus.        Right eye: No discharge.         Left eye: No discharge.      Extraocular Movements: Extraocular movements intact.      Conjunctiva/sclera: Conjunctivae normal.      Pupils: Pupils are equal, round, and reactive to light.   Cardiovascular:      Rate and Rhythm: Normal rate and regular rhythm.      Pulses: Normal pulses.      Heart sounds: Normal heart sounds. No murmur heard.     No friction rub. No gallop.   Pulmonary:      Effort: Pulmonary effort is normal. No respiratory distress.      Breath sounds: Normal breath sounds. No wheezing, rhonchi or rales.   Abdominal:      General: Abdomen is flat. Bowel sounds are normal. There is no distension.      Palpations: Abdomen is soft.      Tenderness: There is no abdominal  "tenderness. There is no guarding.   Musculoskeletal:         General: No swelling, tenderness, deformity or signs of injury. Normal range of motion.      Right lower leg: No edema.      Left lower leg: Edema (1+, pitting) present.   Skin:     General: Skin is warm and dry.      Capillary Refill: Capillary refill takes less than 2 seconds.      Coloration: Skin is not jaundiced.      Findings: No bruising, erythema or rash.   Neurological:      Mental Status: She is alert.      Comments: AAO to person, place, time, and situation; CN II-XII grossly intact         PREVENTIVE CARE:  Lab Work:    DM (age>45 or HTN):  Lab Results   Component Value Date    HGBA1C 5.2 04/10/2023     Lipid :  Lab Results   Component Value Date    CHOL 200 04/10/2023    TRIG 93 04/10/2023    HDL 48 04/10/2023    .00 04/10/2023     Thyroid:  Lab Results   Component Value Date    TSH 2.4621 03/24/2022    T4 8.85 03/24/2022     Screening:    Mammo: mammogram ordered (10/19/2023); patient to schedule  DEXA (age>65 or FRAX > 9.3%): Not indicated  Lung Ca (30PY smoker age 55-79 or quit in last 15y): Not indicated; patient does not meet 30 PY  Colon Ca: (age 45-75-annual FOBT, 5y flex + FOBTq3 or 10y c-scope): Colonoscopy (10/06/2023) showed multiple polyps with subsequent polypectomy; will repeat colonoscopy in 5 years  AAA (smoker 65-76): Not indicated    ID Titers and Vaccinations:    Immunization History   Administered Date(s) Administered    COVID-19, MRNA, LN-S, PF (Pfizer) (Gray Cap) 02/12/2022    COVID-19, MRNA, LN-S, PF (Pfizer) (Purple Cap) 07/15/2021, 08/05/2021    Hepatitis B, Adult 07/11/2013, 08/14/2013      HIV (once age 15-65):  No results found for: "HIV1X2", "IDI49MKVE"     Hepatitis Screening: No results found for: "HAV", "HEPAIGM", "HEPBIGM", "HEPBCAB", "HBEAG", "HEPCAB"   Pneumococcal vaccine (65 and over or high risk): Not indicated  Influenza Vaccine:  Refused  Tetanus:  Refused  Zoster vaccination (> 50y.o):  " Refused  Covid vaccine: Refused  HPV: Negative in past; awaiting pap smear results  PAP Smear: Pap smear performed (10/19/2023); awaiting results; hx of abnormal pap smear    ASSESSMENT/PLAN:    Obesity  BMI 40.39  -will obtain POCT A1c  -educated and discussed DASH diet and other dietary changes at length  -patient would like to avoid weight loss medication at this time  -we will refer patient to physical therapy to increase ambulation as patient reports decreased ambulation secondary    Lower extremity edema  -previously attributed to medication adverse effect at which time amlodipine was discontinued  -patient continues to have LLE edema without     Chronic back pain  -follows with pain management specialist  -follows with orthopedics  -on norco 10 mg q.d. and duloxetine 60 mg q.d. by pain management  -pain well controlled with this regimen  -management per pain management and Orthopedics    Hypertension  /85  -amlodipine discontinued due to lower extremity edema  -lisinopril discontinued due to chronic cough  -will increase losartan to 50 mg q.d.  -informed patient to keep log of home blood pressures and to bring blood pressure log with her to review at next visit     Allergies  -patient reports seasonal pattern of symptoms  -continue Flonase  -refilled loratadine 10 mg q.d.    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 at recent Ob/gyn visit  -rx bactrim bid at that time  -continue management per Ob/gyn    Health Maintenance:  -CAGE negative  -ordered lab work  -refused all vaccines    Follow-up in 6 months    Future Appointments   Date Time Provider Department Center   12/29/2023  9:30 AM SCHEDULE, LORRAINE PATEL  SURGERY [facility] FM RES [place]   4/15/2024 12:30 PM PROVIDERS, JOHAN OPHTOMA PRADO OPHTH [place]   9/27/2024  9:15 AM Evan Casillas MD [facility] [place]ER[facility] [place]   10/24/2024  2:00 PM Deb Levine, TIAP [facility] GYN [place]       The patient was seen with and plan was discussed with Dr. Mathew, who agrees.     Will Rodriguez MD  [facility] Internal Medicine PGY-1

## 2023-10-24 NOTE — TELEPHONE ENCOUNTER
----- Message from Slime Oconnell MA sent at 10/23/2023  1:45 PM CDT -----  Regarding: Bactrim  Pt called and stated her bactrim order is wrong she stated you informed her she would 2 pills twice a day for 7 days. The order is written for 2 pills twice a day for 7 days with only 10 tablets.     Please advise

## 2023-11-17 ENCOUNTER — LAB VISIT (OUTPATIENT)
Dept: LAB | Facility: HOSPITAL | Age: 58
End: 2023-11-17
Payer: MEDICAID

## 2023-11-17 DIAGNOSIS — E66.01 CLASS 3 SEVERE OBESITY DUE TO EXCESS CALORIES WITH SERIOUS COMORBIDITY AND BODY MASS INDEX (BMI) OF 40.0 TO 44.9 IN ADULT: ICD-10-CM

## 2023-11-17 LAB
ALBUMIN SERPL-MCNC: 4.1 G/DL (ref 3.4–4.8)
ALBUMIN/GLOB SERPL: 1.3 RATIO (ref 1.1–2)
ALP SERPL-CCNC: 68 UNIT/L (ref 40–150)
ALT SERPL-CCNC: 13 UNIT/L (ref 0–55)
AST SERPL-CCNC: 16 UNIT/L (ref 5–34)
BASOPHILS # BLD AUTO: 0.03 X10(3)/MCL
BASOPHILS NFR BLD AUTO: 0.4 %
BILIRUB SERPL-MCNC: 0.5 MG/DL
BUN SERPL-MCNC: 8.5 MG/DL (ref 9.8–20.1)
CALCIUM SERPL-MCNC: 9.4 MG/DL (ref 8.4–10.2)
CHLORIDE SERPL-SCNC: 107 MMOL/L (ref 98–107)
CHOLEST SERPL-MCNC: 183 MG/DL
CHOLEST/HDLC SERPL: 3 {RATIO} (ref 0–5)
CO2 SERPL-SCNC: 29 MMOL/L (ref 23–31)
CREAT SERPL-MCNC: 0.78 MG/DL (ref 0.55–1.02)
EOSINOPHIL # BLD AUTO: 0.22 X10(3)/MCL (ref 0–0.9)
EOSINOPHIL NFR BLD AUTO: 2.9 %
ERYTHROCYTE [DISTWIDTH] IN BLOOD BY AUTOMATED COUNT: 14.2 % (ref 11.5–17)
GFR SERPLBLD CREATININE-BSD FMLA CKD-EPI: >60 MLS/MIN/1.73/M2
GLOBULIN SER-MCNC: 3.2 GM/DL (ref 2.4–3.5)
GLUCOSE SERPL-MCNC: 106 MG/DL (ref 82–115)
HCT VFR BLD AUTO: 41.3 % (ref 37–47)
HDLC SERPL-MCNC: 56 MG/DL (ref 35–60)
HGB BLD-MCNC: 14.6 G/DL (ref 12–16)
IMM GRANULOCYTES # BLD AUTO: 0.02 X10(3)/MCL (ref 0–0.04)
IMM GRANULOCYTES NFR BLD AUTO: 0.3 %
LDLC SERPL CALC-MCNC: 115 MG/DL (ref 50–140)
LYMPHOCYTES # BLD AUTO: 2.36 X10(3)/MCL (ref 0.6–4.6)
LYMPHOCYTES NFR BLD AUTO: 31.1 %
MCH RBC QN AUTO: 28.1 PG (ref 27–31)
MCHC RBC AUTO-ENTMCNC: 35.4 G/DL (ref 33–36)
MCV RBC AUTO: 79.4 FL (ref 80–94)
MONOCYTES # BLD AUTO: 0.48 X10(3)/MCL (ref 0.1–1.3)
MONOCYTES NFR BLD AUTO: 6.3 %
NEUTROPHILS # BLD AUTO: 4.49 X10(3)/MCL (ref 2.1–9.2)
NEUTROPHILS NFR BLD AUTO: 59 %
NRBC BLD AUTO-RTO: 0 %
PLATELET # BLD AUTO: 215 X10(3)/MCL (ref 130–400)
PMV BLD AUTO: 11.1 FL (ref 7.4–10.4)
POTASSIUM SERPL-SCNC: 3.6 MMOL/L (ref 3.5–5.1)
PROT SERPL-MCNC: 7.3 GM/DL (ref 5.8–7.6)
RBC # BLD AUTO: 5.2 X10(6)/MCL (ref 4.2–5.4)
SODIUM SERPL-SCNC: 142 MMOL/L (ref 136–145)
T4 FREE SERPL-MCNC: 1.23 NG/DL (ref 0.7–1.48)
TRIGL SERPL-MCNC: 62 MG/DL (ref 37–140)
TSH SERPL-ACNC: 2.92 UIU/ML (ref 0.35–4.94)
VLDLC SERPL CALC-MCNC: 12 MG/DL
WBC # SPEC AUTO: 7.6 X10(3)/MCL (ref 4.5–11.5)

## 2023-11-17 PROCEDURE — 36415 COLL VENOUS BLD VENIPUNCTURE: CPT

## 2023-11-17 PROCEDURE — 85025 COMPLETE CBC W/AUTO DIFF WBC: CPT

## 2023-11-17 PROCEDURE — 80061 LIPID PANEL: CPT

## 2023-11-17 PROCEDURE — 80053 COMPREHEN METABOLIC PANEL: CPT

## 2023-11-17 PROCEDURE — 84443 ASSAY THYROID STIM HORMONE: CPT

## 2023-11-17 PROCEDURE — 84439 ASSAY OF FREE THYROXINE: CPT

## 2023-12-14 ENCOUNTER — HOSPITAL ENCOUNTER (OUTPATIENT)
Dept: RADIOLOGY | Facility: HOSPITAL | Age: 58
Discharge: HOME | End: 2023-12-14
Attending: NURSE PRACTITIONER
Payer: MEDICAID

## 2023-12-14 DIAGNOSIS — Z12.31 SCREENING MAMMOGRAM FOR BREAST CANCER: ICD-10-CM

## 2023-12-14 PROCEDURE — 77067 SCR MAMMO BI INCL CAD: CPT | Mod: TC

## 2023-12-14 PROCEDURE — 77063 BREAST TOMOSYNTHESIS BI: CPT | Mod: 26,,, | Performed by: RADIOLOGY

## 2023-12-14 PROCEDURE — 77067 SCR MAMMO BI INCL CAD: CPT | Mod: 26,,, | Performed by: RADIOLOGY

## 2023-12-14 PROCEDURE — 77063 MAMMO DIGITAL SCREENING BILAT WITH TOMO: ICD-10-PCS | Mod: 26,,, | Performed by: RADIOLOGY

## 2023-12-14 PROCEDURE — 77067 MAMMO DIGITAL SCREENING BILAT WITH TOMO: ICD-10-PCS | Mod: 26,,, | Performed by: RADIOLOGY

## 2023-12-22 RX ORDER — ALBUTEROL SULFATE 90 UG/1
AEROSOL, METERED RESPIRATORY (INHALATION)
Qty: 18 G | Refills: 3 | Status: SHIPPED | OUTPATIENT
Start: 2023-12-22

## 2024-01-26 ENCOUNTER — OFFICE VISIT (OUTPATIENT)
Dept: FAMILY MEDICINE | Facility: CLINIC | Age: 59
End: 2024-01-26
Payer: MEDICAID

## 2024-01-26 VITALS
SYSTOLIC BLOOD PRESSURE: 143 MMHG | BODY MASS INDEX: 39.59 KG/M2 | DIASTOLIC BLOOD PRESSURE: 89 MMHG | WEIGHT: 188.63 LBS | OXYGEN SATURATION: 100 % | HEART RATE: 89 BPM | TEMPERATURE: 98 F | HEIGHT: 58 IN | RESPIRATION RATE: 20 BRPM

## 2024-01-26 DIAGNOSIS — L65.9 ALOPECIA OF SCALP: Primary | ICD-10-CM

## 2024-01-26 PROCEDURE — 96372 THER/PROPH/DIAG INJ SC/IM: CPT | Mod: 59,PBBFAC

## 2024-01-26 PROCEDURE — 99215 OFFICE O/P EST HI 40 MIN: CPT | Mod: PBBFAC

## 2024-01-26 PROCEDURE — 11900 INJECT SKIN LESIONS </W 7: CPT | Mod: PBBFAC

## 2024-01-26 RX ORDER — LIDOCAINE HYDROCHLORIDE 10 MG/ML
3 INJECTION, SOLUTION EPIDURAL; INFILTRATION; INTRACAUDAL; PERINEURAL
Status: COMPLETED | OUTPATIENT
Start: 2024-01-26 | End: 2024-01-26

## 2024-01-26 RX ORDER — TRIAMCINOLONE ACETONIDE 40 MG/ML
40 INJECTION, SUSPENSION INTRA-ARTICULAR; INTRAMUSCULAR
Status: COMPLETED | OUTPATIENT
Start: 2024-01-26 | End: 2024-01-26

## 2024-01-26 RX ADMIN — LIDOCAINE HYDROCHLORIDE 30 MG: 10 INJECTION, SOLUTION EPIDURAL; INFILTRATION; INTRACAUDAL; PERINEURAL at 09:01

## 2024-01-26 RX ADMIN — TRIAMCINOLONE ACETONIDE 40 MG: 40 INJECTION, SUSPENSION INTRA-ARTICULAR; INTRAMUSCULAR at 09:01

## 2024-01-26 NOTE — PROGRESS NOTES
"[facility] OFFICE VISIT NOTE  Keri Sahni  [id number]  01/26/2024    Chief Complaint   Patient presents with    Hair Loss     F/u injection scalp, food security=denied       Keri Sahni is a 58 y.o. female  presenting to [facility] for intradermal kenalog injections for alopecia areata. Was seen on 10/23 and received injections at that time. Tolerated procedure and reports growth of hair. Patient would like to continue with intradermal kenalog injection.      Review of Systems  As per hpi    Vitals:    01/26/24 0854   BP: (!) 143/89   Pulse: 89   Resp: 20   Temp: 98.4 °F (36.9 °C)   TempSrc: Oral   SpO2: 100%   Weight: 85.5 kg (188 lb 9.6 oz)   Height: 4' 10" (1.473 m)      Physical Exam  General: Alert, NAD  Skin: Patchy hair loss to frontal scalp, general thinning. New growth of fine hair along the crown  Psych: cooperative          Procedure 1: Kenalog with Lidocaine 1% Intradermal/intralesional Injections  Performed by: Ian Martin DO  Supervised by: Michael Crabtree MD  Consent: signed consent obtained after discussion of risks, benefits, and alternative treatments  Description:  Head was cleaned with chloroprep. 1.5 cc of Triamcinolone was injected intradermally .1mL at a time across scalp on areas of patchiness (crown). Blood loss minimal.   The patient tolerated the procedure well with no complications.     Current Medications:   Current Outpatient Medications   Medication Sig Dispense Refill    albuterol (PROVENTIL/VENTOLIN HFA) 90 mcg/actuation inhaler See Instructions, INHALE 2 PUFFS BY MOUTH EVERY 6 HOURS AS NEEDED FOR WHEEZING, # 2 EA, 6 Refill(s), Pharmacy: [id number], 159, cm, Height/Length Dosing, 09/01/21 14:36:00 CDT, 90.5, kg, Weight Dosing, 09/01/21 14:36:00 CDT 18 g 3    baclofen (LIORESAL) 10 MG tablet Take 10 mg by mouth 2 (two) times daily.      betamethasone dipropionate 0.05 % cream Apply topically 2 (two) times daily.      busPIRone (BUSPAR) 5 MG Tab Take 5 mg by mouth 3 (three) " times daily.      celecoxib (CELEBREX) 200 MG capsule Take 200 mg by mouth.      DULoxetine (CYMBALTA) 60 MG capsule Take 120 mg by mouth.      fluocinonide (LIDEX) 0.05 % external solution Apply topically 2 (two) times daily as needed (Itching). 60 mL 1    fluticasone propionate (FLONASE) 50 mcg/actuation nasal spray 1 spray (50 mcg total) by Each Nostril route once daily. 11.1 mL 1    gabapentin (NEURONTIN) 400 MG capsule Take 400 mg by mouth 3 (three) times daily.      guaiFENesin (MUCINEX) 600 mg 12 hr tablet Take 600 mg by mouth.      HYDROcodone-acetaminophen (NORCO)  mg per tablet Take by mouth.      ketoconazole (NIZORAL) 2 % cream Apply topically once daily. Apply for 4 weeks only 60 g 1    ketoconazole (NIZORAL) 2 % shampoo Apply topically twice a week. 120 mL 1    loratadine (CLARITIN) 10 mg tablet Take 1 tablet (10 mg total) by mouth daily as needed for Allergies. 30 tablet 1    losartan (COZAAR) 50 MG tablet Take 1 tablet (50 mg total) by mouth once daily. 90 tablet 3    polyethylene glycol (MOVIPREP) 100-7.5-2.691 gram solution Take as directed per instructions provided by GI Clinic (Patient not taking: Reported on 10/19/2023) 1 kit 0    promethazine-dextromethorphan (PROMETHAZINE-DM) 6.25-15 mg/5 mL Syrp Take 5 mLs by mouth every 6 to 8 hours as needed (cough). May cause sedation.  Do not drive or operate heavy machinery. (Patient not taking: Reported on 9/27/2023) 60 mL 0    traZODone (DESYREL) 100 MG tablet Take 100 mg by mouth every evening.       No current facility-administered medications for this visit.       Assessment:   1. Alopecia of scalp  Intralesional steroid injection performed today, see above procedure note  Care instructions provided           Follow up in about 4 weeks (around 2/23/2024).     Ian Martin DO  [employer]  Resident, HO-1

## 2024-01-30 DIAGNOSIS — T78.40XA ALLERGY, INITIAL ENCOUNTER: ICD-10-CM

## 2024-01-31 RX ORDER — FLUTICASONE PROPIONATE 50 MCG
1 SPRAY, SUSPENSION (ML) NASAL DAILY
Qty: 11.1 ML | Refills: 1 | Status: SHIPPED | OUTPATIENT
Start: 2024-01-31 | End: 2024-03-31

## 2024-02-07 ENCOUNTER — OFFICE VISIT (OUTPATIENT)
Dept: URGENT CARE | Facility: CLINIC | Age: 59
End: 2024-02-07
Payer: MEDICAID

## 2024-02-07 VITALS
DIASTOLIC BLOOD PRESSURE: 84 MMHG | HEIGHT: 60 IN | RESPIRATION RATE: 18 BRPM | BODY MASS INDEX: 35.73 KG/M2 | SYSTOLIC BLOOD PRESSURE: 146 MMHG | HEART RATE: 84 BPM | WEIGHT: 182 LBS | TEMPERATURE: 99 F | OXYGEN SATURATION: 96 %

## 2024-02-07 DIAGNOSIS — R05.1 ACUTE COUGH: ICD-10-CM

## 2024-02-07 DIAGNOSIS — R09.89 SYMPTOMS OF UPPER RESPIRATORY INFECTION (URI): ICD-10-CM

## 2024-02-07 DIAGNOSIS — J10.1 INFLUENZA A: Primary | ICD-10-CM

## 2024-02-07 DIAGNOSIS — R06.2 WHEEZING: ICD-10-CM

## 2024-02-07 LAB
CTP QC/QA: YES
MOLECULAR STREP A: NEGATIVE
POC MOLECULAR INFLUENZA A AGN: POSITIVE
POC MOLECULAR INFLUENZA B AGN: NEGATIVE
SARS-COV-2 RDRP RESP QL NAA+PROBE: NEGATIVE

## 2024-02-07 PROCEDURE — 87651 STREP A DNA AMP PROBE: CPT | Mod: PBBFAC | Performed by: NURSE PRACTITIONER

## 2024-02-07 PROCEDURE — 87635 SARS-COV-2 COVID-19 AMP PRB: CPT | Mod: PBBFAC | Performed by: NURSE PRACTITIONER

## 2024-02-07 PROCEDURE — 99214 OFFICE O/P EST MOD 30 MIN: CPT | Mod: PBBFAC | Performed by: NURSE PRACTITIONER

## 2024-02-07 PROCEDURE — 87502 INFLUENZA DNA AMP PROBE: CPT | Mod: PBBFAC | Performed by: NURSE PRACTITIONER

## 2024-02-07 PROCEDURE — 99214 OFFICE O/P EST MOD 30 MIN: CPT | Mod: S$PBB,,, | Performed by: NURSE PRACTITIONER

## 2024-02-07 RX ORDER — BENZONATATE 200 MG/1
200 CAPSULE ORAL 3 TIMES DAILY PRN
Qty: 30 CAPSULE | Refills: 0 | Status: SHIPPED | OUTPATIENT
Start: 2024-02-07 | End: 2024-04-09

## 2024-02-07 RX ORDER — PROMETHAZINE HYDROCHLORIDE AND DEXTROMETHORPHAN HYDROBROMIDE 6.25; 15 MG/5ML; MG/5ML
10 SYRUP ORAL
Qty: 120 ML | Refills: 0 | Status: SHIPPED | OUTPATIENT
Start: 2024-02-07 | End: 2024-04-09

## 2024-02-08 NOTE — PATIENT INSTRUCTIONS
Please follow instructions on patient education material.  Return to urgent care in 2 to 3 days if symptoms are not improving, immediately if you develop any new or worsening symptoms.     - OTC cold/flu products as desired for symptoms  - Plenty of fluids  - Humidified air  - Tylenol or Motrin for pain/fever    Go to the ER if you experience chest pain with shortness of breath, shortness of breath when moving around your house, high fevers 103.0+, excessive vomiting/diarrhea, or general distress.

## 2024-02-23 ENCOUNTER — OFFICE VISIT (OUTPATIENT)
Dept: FAMILY MEDICINE | Facility: CLINIC | Age: 59
End: 2024-02-23
Payer: MEDICAID

## 2024-02-23 VITALS
HEART RATE: 79 BPM | SYSTOLIC BLOOD PRESSURE: 146 MMHG | BODY MASS INDEX: 36.52 KG/M2 | DIASTOLIC BLOOD PRESSURE: 87 MMHG | HEIGHT: 60 IN | WEIGHT: 186 LBS | TEMPERATURE: 99 F | RESPIRATION RATE: 18 BRPM | OXYGEN SATURATION: 99 %

## 2024-02-23 DIAGNOSIS — L65.9 ALOPECIA: Primary | ICD-10-CM

## 2024-02-23 PROCEDURE — 99215 OFFICE O/P EST HI 40 MIN: CPT | Mod: PBBFAC | Performed by: FAMILY MEDICINE

## 2024-02-23 RX ORDER — BETAMETHASONE DIPROPIONATE 0.5 MG/G
CREAM TOPICAL 2 TIMES DAILY
Qty: 45 G | Refills: 2 | Status: SHIPPED | OUTPATIENT
Start: 2024-02-23

## 2024-02-23 NOTE — PROGRESS NOTES
Subjective:       Patient ID: Keri Sahni is a 58 y.o. female.    Chief Complaint: Alopecia (Some improvement noted. Intra lesional injections)      Alopecia      57 yo female with scalp alopecia.  Received intralesional steroid injections with moderate improvement.  She is willing to try high-potency topical steroids for 6 weeks.    Review of Systems   Endocrine: Positive for hair loss.   Integumentary:         As above         Objective:       Vital Signs  Temp: 98.5 °F (36.9 °C)  Temp Source: Oral  Pulse: 79  Resp: 18  SpO2: 99 %  BP: (!) 146/87  BP Location: Right arm  Patient Position: Sitting  Pain Score:   7  Height and Weight  Height: 5' (152.4 cm)  Weight: 84.4 kg (186 lb)  BSA (Calculated - sq m): 1.89 sq meters  BMI (Calculated): 36.3  Weight in (lb) to have BMI = 25: 127.7]  Physical Exam  Vitals reviewed.   Constitutional:       Appearance: Normal appearance.   HENT:      Head: Normocephalic and atraumatic.   Eyes:      Extraocular Movements: Extraocular movements intact.      Conjunctiva/sclera: Conjunctivae normal.   Skin:     Comments: Frontal and central scalp alopecia.  Moderated improvement.    Neurological:      General: No focal deficit present.      Mental Status: She is alert.   Psychiatric:         Mood and Affect: Mood and affect normal.         Speech: Speech normal.         Behavior: Behavior normal. Behavior is cooperative.         Thought Content: Thought content does not include homicidal or suicidal ideation.         Assessment:       Problem List Items Addressed This Visit    None  Visit Diagnoses       Alopecia    -  Primary    Relevant Medications    betamethasone dipropionate 0.05 % cream            Plan:   Use medications as directed  ER precautions  FU in 6 weeks

## 2024-03-06 ENCOUNTER — TELEPHONE (OUTPATIENT)
Dept: FAMILY MEDICINE | Facility: CLINIC | Age: 59
End: 2024-03-06
Payer: MEDICAID

## 2024-04-01 ENCOUNTER — HOSPITAL ENCOUNTER (OUTPATIENT)
Dept: RADIOLOGY | Facility: HOSPITAL | Age: 59
Discharge: HOME | End: 2024-04-01
Payer: MEDICAID

## 2024-04-01 ENCOUNTER — HOSPITAL ENCOUNTER (OUTPATIENT)
Dept: CARDIOLOGY | Facility: HOSPITAL | Age: 59
Discharge: HOME | End: 2024-04-01
Payer: MEDICAID

## 2024-04-01 VITALS
WEIGHT: 186 LBS | DIASTOLIC BLOOD PRESSURE: 83 MMHG | SYSTOLIC BLOOD PRESSURE: 154 MMHG | HEIGHT: 60 IN | BODY MASS INDEX: 36.52 KG/M2

## 2024-04-01 DIAGNOSIS — R60.0 LOWER EXTREMITY EDEMA: ICD-10-CM

## 2024-04-01 LAB
AV INDEX (PROSTH): 0.62
AV MEAN GRADIENT: 4 MMHG
AV PEAK GRADIENT: 8 MMHG
AV VALVE AREA BY VELOCITY RATIO: 3.06 CM²
AV VALVE AREA: 2.68 CM²
AV VELOCITY RATIO: 0.71
BSA FOR ECHO PROCEDURE: 1.89 M2
CV ECHO LV RWT: 0.52 CM
DOP CALC AO PEAK VEL: 1.39 M/S
DOP CALC AO VTI: 30.4 CM
DOP CALC LVOT AREA: 4.3 CM2
DOP CALC LVOT DIAMETER: 2.35 CM
DOP CALC LVOT PEAK VEL: 0.98 M/S
DOP CALC LVOT STROKE VOLUME: 81.5 CM3
DOP CALC MV VTI: 28.3 CM
DOP CALCLVOT PEAK VEL VTI: 18.8 CM
E WAVE DECELERATION TIME: 191.28 MSEC
E/A RATIO: 0.8
E/E' RATIO: 15.2 M/S
ECHO LV POSTERIOR WALL: 0.91 CM (ref 0.6–1.1)
FRACTIONAL SHORTENING: 29 % (ref 28–44)
HR MV ECHO: 71 BPM
INTERVENTRICULAR SEPTUM: 1.01 CM (ref 0.6–1.1)
LEFT ATRIUM SIZE: 3.69 CM
LEFT INTERNAL DIMENSION IN SYSTOLE: 2.49 CM (ref 2.1–4)
LEFT VENTRICLE DIASTOLIC VOLUME INDEX: 28.56 ML/M2
LEFT VENTRICLE DIASTOLIC VOLUME: 51.7 ML
LEFT VENTRICLE MASS INDEX: 54 G/M2
LEFT VENTRICLE SYSTOLIC VOLUME INDEX: 12.2 ML/M2
LEFT VENTRICLE SYSTOLIC VOLUME: 22.15 ML
LEFT VENTRICULAR INTERNAL DIMENSION IN DIASTOLE: 3.52 CM (ref 3.5–6)
LEFT VENTRICULAR MASS: 98.26 G
LV LATERAL E/E' RATIO: 12.67 M/S
LV SEPTAL E/E' RATIO: 19 M/S
LVOT MG: 2.02 MMHG
LVOT MV: 0.64 CM/S
MV MEAN GRADIENT: 2 MMHG
MV PEAK A VEL: 0.95 M/S
MV PEAK E VEL: 0.76 M/S
MV PEAK GRADIENT: 4 MMHG
MV STENOSIS PRESSURE HALF TIME: 55.47 MS
MV VALVE AREA BY CONTINUITY EQUATION: 2.88 CM2
MV VALVE AREA P 1/2 METHOD: 3.97 CM2
OHS LV EJECTION FRACTION SIMPSONS BIPLANE MOD: 60 %
PISA TR MAX VEL: 2.23 M/S
RA PRESSURE ESTIMATED: 3 MMHG
RV TB RVSP: 5 MMHG
TDI LATERAL: 0.06 M/S
TDI SEPTAL: 0.04 M/S
TDI: 0.05 M/S
TR MAX PG: 20 MMHG
TV REST PULMONARY ARTERY PRESSURE: 23 MMHG
Z-SCORE OF LEFT VENTRICULAR DIMENSION IN END DIASTOLE: -3.52
Z-SCORE OF LEFT VENTRICULAR DIMENSION IN END SYSTOLE: -1.72

## 2024-04-01 PROCEDURE — 93971 EXTREMITY STUDY: CPT | Mod: LT

## 2024-04-01 PROCEDURE — 93306 TTE W/DOPPLER COMPLETE: CPT

## 2024-04-05 ENCOUNTER — OFFICE VISIT (OUTPATIENT)
Dept: FAMILY MEDICINE | Facility: CLINIC | Age: 59
End: 2024-04-05
Payer: MEDICAID

## 2024-04-05 VITALS
WEIGHT: 181.38 LBS | RESPIRATION RATE: 18 BRPM | DIASTOLIC BLOOD PRESSURE: 70 MMHG | HEIGHT: 60 IN | HEART RATE: 80 BPM | TEMPERATURE: 98 F | OXYGEN SATURATION: 98 % | SYSTOLIC BLOOD PRESSURE: 101 MMHG | BODY MASS INDEX: 35.61 KG/M2

## 2024-04-05 DIAGNOSIS — L65.9 ALOPECIA: Primary | ICD-10-CM

## 2024-04-05 DIAGNOSIS — L81.9 HYPERPIGMENTATION: ICD-10-CM

## 2024-04-05 PROCEDURE — 99215 OFFICE O/P EST HI 40 MIN: CPT | Mod: PBBFAC

## 2024-04-05 RX ORDER — HYDROQUINONE 40 MG/G
CREAM TOPICAL 2 TIMES DAILY
Qty: 46 G | Refills: 0 | Status: SHIPPED | OUTPATIENT
Start: 2024-04-05 | End: 2024-05-05

## 2024-04-05 NOTE — PROGRESS NOTES
[facility]  Keri Sahni  [id number]  04/05/2024    Chief Complaint   Patient presents with    Alopecia       Alopecia      Keri Sahni is a 58 y.o. female  presenting to [facility] for f/u of scalp alopecia. Previously receiving intralesional steroid injections with moderate improvement.  Began using high potency steroid cream 6 weeks ago and reports greater improvement than the injections. Pt also with hyperpigmentation of her cheeks bilaterally, would like something to lighten.    ROS  As per HPI.    Blood pressure 101/70, pulse 80, temperature 98.2 °F (36.8 °C), temperature source Oral, resp. rate 18, height 5' (1.524 m), weight 82.3 kg (181 lb 6.4 oz), SpO2 98 %.   Physical Exam  Vitals reviewed.   Constitutional:       Appearance: She is not ill-appearing.   Skin:     Comments: Frontal and central scalp alopecia, improving   Flat, smooth, hyperpigmentation of bilateral cheeks   Neurological:      Mental Status: She is alert.         Current Medications:   Current Outpatient Medications   Medication Sig Dispense Refill    albuterol (PROVENTIL/VENTOLIN HFA) 90 mcg/actuation inhaler See Instructions, INHALE 2 PUFFS BY MOUTH EVERY 6 HOURS AS NEEDED FOR WHEEZING, # 2 EA, 6 Refill(s), Pharmacy: [id number], 159, cm, Height/Length Dosing, 09/01/21 14:36:00 CDT, 90.5, kg, Weight Dosing, 09/01/21 14:36:00 CDT 18 g 3    baclofen (LIORESAL) 10 MG tablet Take 10 mg by mouth 2 (two) times daily.      benzonatate (TESSALON) 200 MG capsule Take 1 capsule (200 mg total) by mouth 3 (three) times daily as needed for Cough. 30 capsule 0    betamethasone dipropionate 0.05 % cream Apply topically 2 (two) times daily. 45 g 2    busPIRone (BUSPAR) 5 MG Tab Take 5 mg by mouth 3 (three) times daily.      celecoxib (CELEBREX) 200 MG capsule Take 200 mg by mouth.      DULoxetine (CYMBALTA) 60 MG capsule Take 120 mg by mouth.      fluocinonide (LIDEX) 0.05 % external solution Apply  topically 2 (two) times daily as needed (Itching). 60 mL 1    gabapentin (NEURONTIN) 400 MG capsule Take 400 mg by mouth 3 (three) times daily.      guaiFENesin (MUCINEX) 600 mg 12 hr tablet Take 600 mg by mouth.      HYDROcodone-acetaminophen (NORCO)  mg per tablet Take by mouth.      hydroquinone 4 % Crea Apply topically 2 (two) times daily. 46 g 0    ketoconazole (NIZORAL) 2 % cream Apply topically once daily. Apply for 4 weeks only 60 g 1    ketoconazole (NIZORAL) 2 % shampoo Apply topically twice a week. 120 mL 1    loratadine (CLARITIN) 10 mg tablet Take 1 tablet (10 mg total) by mouth daily as needed for Allergies. 30 tablet 1    losartan (COZAAR) 50 MG tablet Take 1 tablet (50 mg total) by mouth once daily. 90 tablet 3    polyethylene glycol (MOVIPREP) 100-7.5-2.691 gram solution Take as directed per instructions provided by GI Clinic 1 kit 0    promethazine-dextromethorphan (PROMETHAZINE-DM) 6.25-15 mg/5 mL Syrp Take 5 mLs by mouth every 6 to 8 hours as needed (cough). May cause sedation.  Do not drive or operate heavy machinery. 60 mL 0    promethazine-dextromethorphan (PROMETHAZINE-DM) 6.25-15 mg/5 mL Syrp Take 10 mLs by mouth every 6 to 8 hours as needed (cough). May cause sedation.  Do not drive or operate heavy machinery after taking this medication. 120 mL 0    traZODone (DESYREL) 100 MG tablet Take 100 mg by mouth every evening.       No current facility-administered medications for this visit.       Assessment:   1. Alopecia    2. Hyperpigmentation        Plan:  - Hydroquinone cream for facial hyperpigmentation.  - Continue high potency steroid cream for alopecia.     Orders Placed This Encounter    hydroquinone 4 % Crea       Barbie Horne DO  U  HO-1

## 2024-04-09 ENCOUNTER — OFFICE VISIT (OUTPATIENT)
Dept: INTERNAL MEDICINE | Facility: CLINIC | Age: 59
End: 2024-04-09
Payer: MEDICAID

## 2024-04-09 VITALS
RESPIRATION RATE: 14 BRPM | BODY MASS INDEX: 35.96 KG/M2 | WEIGHT: 183.19 LBS | HEART RATE: 73 BPM | OXYGEN SATURATION: 97 % | HEIGHT: 60 IN | SYSTOLIC BLOOD PRESSURE: 119 MMHG | TEMPERATURE: 99 F | DIASTOLIC BLOOD PRESSURE: 80 MMHG

## 2024-04-09 DIAGNOSIS — E66.01 CLASS 3 SEVERE OBESITY DUE TO EXCESS CALORIES WITH SERIOUS COMORBIDITY AND BODY MASS INDEX (BMI) OF 40.0 TO 44.9 IN ADULT: ICD-10-CM

## 2024-04-09 DIAGNOSIS — J45.909 ASTHMA DUE TO SEASONAL ALLERGIES: ICD-10-CM

## 2024-04-09 PROCEDURE — 99214 OFFICE O/P EST MOD 30 MIN: CPT | Mod: PBBFAC

## 2024-04-09 RX ORDER — ACETAMINOPHEN AND CODEINE PHOSPHATE 300; 60 MG/1; MG/1
1 TABLET ORAL EVERY 8 HOURS PRN
COMMUNITY

## 2024-04-09 RX ORDER — FLUTICASONE PROPIONATE 50 MCG
1 SPRAY, SUSPENSION (ML) NASAL DAILY
Qty: 16 G | Refills: 3 | Status: SHIPPED | OUTPATIENT
Start: 2024-04-09 | End: 2025-04-09

## 2024-04-09 RX ORDER — ALBUTEROL SULFATE 90 UG/1
AEROSOL, METERED RESPIRATORY (INHALATION)
Qty: 18 G | Refills: 3 | Status: SHIPPED | OUTPATIENT
Start: 2024-04-09

## 2024-04-09 RX ORDER — FLUTICASONE PROPIONATE 50 MCG
1 SPRAY, SUSPENSION (ML) NASAL DAILY
COMMUNITY
End: 2024-04-09 | Stop reason: SDUPTHER

## 2024-04-09 RX ORDER — LORATADINE 10 MG/1
10 TABLET ORAL DAILY PRN
Qty: 30 TABLET | Refills: 1 | Status: SHIPPED | OUTPATIENT
Start: 2024-04-09

## 2024-04-09 RX ORDER — LOSARTAN POTASSIUM 50 MG/1
50 TABLET ORAL DAILY
Qty: 90 TABLET | Refills: 3 | Status: SHIPPED | OUTPATIENT
Start: 2024-04-09 | End: 2025-04-09

## 2024-04-09 NOTE — PROGRESS NOTES
[facility] INTERNAL MEDICINE CLINIC NOTE    Patient Name: Keri Sahni  YOB: 1965   MRN: [id number]  Appointment Date: 4/9/2024  AppointmentTime: 01:35 PM    Subjective     HPI: Keri Sahni is a 58 y.o.  female with PMH positive for obesity, allergies, alopecia areata, abnormal uterine bleeding s/p supracervical hysterectomy with BSO, anxiety, depression, who presented to IM clinic today for follow up visit. Patient denies any complaints at this time. Reports she is elated to find out that she has lost twelve pounds since her last visit. She further indicates that she has put an emphasis on improving her diet since our last discussion by attempting to minimize intake processed foods, fast food, and even canned goods due to the amount of salt in canned goods.     Past Medical History:  Past Medical History:   Diagnosis Date    Back injury     Benign essential HTN     Depression     Insomnia     Knee pain     Obesity     Pregnancy     Sinusitis      Past Surgical History:  Past Surgical History:   Procedure Laterality Date    APPENDECTOMY      CATARACT EXTRACTION Left 04/01/2021    CATARACT EXTRACTION Right 11/19/2020    COLONOSCOPY, WITH POLYPECTOMY USING SNARE N/A 10/6/2023    Procedure: COLONOSCOPY, WITH POLYPECTOMY USING SNARE;  Surgeon: Juan Vyas MD;  Location: [facility] ENDOSCOPY;  Service: General;  Laterality: N/A;  hot    HEEL SPUR SURGERY      HYSTERECTOMY      KNEE SURGERY      NECK SURGERY      TONSILLECTOMY       Family History:  Family History   Problem Relation Age of Onset    Diabetes Mother     Hypertension Mother     Diabetes Father     Cataracts Father     Glaucoma Father     Hypertension Father     Cataracts Sister     Glaucoma Sister     Hypertension Sister     Stroke Sister     Thyroid disease Sister     No Known Problems Brother     No Known Problems Maternal Aunt     Prostate cancer Maternal Uncle     No Known Problems Paternal Aunt     No Known Problems  Paternal Uncle     Blindness Maternal Grandmother     Glaucoma Maternal Grandmother     No Known Problems Maternal Grandfather     No Known Problems Paternal Grandmother     No Known Problems Paternal Grandfather     Stroke Cousin     Amblyopia Neg Hx     Macular degeneration Neg Hx     Retinal detachment Neg Hx     Strabismus Neg Hx      Social History:  Social History     Tobacco Use    Smoking status: Former     Current packs/day: 0.00     Types: Cigarettes     Quit date: 10/1/2016     Years since quittin.5     Passive exposure: Past    Smokeless tobacco: Former     Quit date: 10/1/2016   Substance Use Topics    Alcohol use: Not Currently    Drug use: Never     Allergies:  Review of patient's allergies indicates:  No Known Allergies  Home Medications:  Prior to Admission medications    Medication Sig Start Date End Date Taking? Authorizing Provider   albuterol (PROVENTIL/VENTOLIN HFA) 90 mcg/actuation inhaler See Instructions, INHALE 2 PUFFS BY MOUTH EVERY 6 HOURS AS NEEDED FOR WHEEZING, # 2 EA, 6 Refill(s), Pharmacy: [id number], 159, cm, Height/Length Dosing, 21 14:36:00 CDT, 90.5, kg, Weight Dosing, 21 14:36:00 CDT 23   Will Rodriguez MD   amLODIPine (NORVASC) 5 MG tablet Take 5 mg by mouth. 23   Provider, Historical   baclofen (LIORESAL) 10 MG tablet Take 10 mg by mouth 2 (two) times daily. 23   Provider, Historical   betamethasone dipropionate 0.05 % cream Apply topically 2 (two) times daily.    Provider, Historical   busPIRone (BUSPAR) 5 MG Tab Take 5 mg by mouth 3 (three) times daily. 23   Provider, Historical   celecoxib (CELEBREX) 200 MG capsule Take 200 mg by mouth. 23   Provider, Historical   DULoxetine (CYMBALTA) 60 MG capsule Take 120 mg by mouth. 23   Provider, Historical   fluocinonide (LIDEX) 0.05 % external solution Apply topically 2 (two) times daily as needed (Itching). 23   Trisha Smalls MD   fluticasone propionate (FLONASE) 50  mcg/actuation nasal spray 1 spray (50 mcg total) by Each Nostril route once daily. 8/29/23   Will Rodriguez MD   gabapentin (NEURONTIN) 400 MG capsule Take 400 mg by mouth 3 (three) times daily. 8/22/23   Provider, Historical   guaiFENesin (MUCINEX) 600 mg 12 hr tablet Take 600 mg by mouth. 8/4/21   Provider, Historical   HYDROcodone-acetaminophen (NORCO)  mg per tablet Take by mouth. 8/22/23   Provider, Historical   ketoconazole (NIZORAL) 2 % cream Apply topically once daily. Apply for 4 weeks only 9/29/23   Trisha Smalls MD   ketoconazole (NIZORAL) 2 % shampoo Apply topically twice a week. 10/2/23   Trisha Smalls MD   loratadine (CLARITIN) 10 mg tablet Take 1 tablet (10 mg total) by mouth once daily. 1/17/23   Trisha Smalls MD   losartan (COZAAR) 25 MG tablet Take 1 tablet (25 mg total) by mouth once daily. 1/17/23 1/17/24  Trisha Smalls MD   polyethylene glycol (MOVIPREP) 100-7.5-2.691 gram solution Take as directed per instructions provided by GI Clinic  Patient not taking: Reported on 10/19/2023 9/8/23   Francesca Rg FNP   promethazine-dextromethorphan (PROMETHAZINE-DM) 6.25-15 mg/5 mL Syrp Take 5 mLs by mouth every 6 to 8 hours as needed (cough). May cause sedation.  Do not drive or operate heavy machinery.  Patient not taking: Reported on 9/27/2023 6/12/23   Omar Marsh MD   sulfamethoxazole-trimethoprim 800-160mg (BACTRIM DS) 800-160 mg Tab Take 1 tablet by mouth 2 (two) times daily. for 7 days 10/19/23 10/26/23  Deb Levine FNP   traZODone (DESYREL) 100 MG tablet Take 100 mg by mouth every evening. 8/30/23   Provider, Historical     Review of Systems:  Review of Systems   Constitutional:  Negative for chills, diaphoresis, fatigue and fever.   HENT:  Negative for ear pain, hearing loss, rhinorrhea, sore throat, tinnitus and trouble swallowing.    Eyes:  Negative for pain, redness and visual disturbance.   Respiratory:  Negative for cough, chest tightness and shortness of  breath.    Cardiovascular:  Negative for chest pain and palpitations.   Gastrointestinal:  Negative for abdominal pain, constipation, diarrhea, nausea and vomiting.   Genitourinary:  Negative for difficulty urinating, dysuria, frequency, hematuria and urgency.   Musculoskeletal:  Positive for arthralgias, back pain, myalgias and neck pain.   Skin:  Negative for rash and wound.   Neurological:  Negative for dizziness, seizures, syncope, weakness, light-headedness, numbness and headaches.   Psychiatric/Behavioral:  Negative for confusion.        Objective     Vitals:   Vitals:    04/09/24 1414   BP: 119/80   Pulse: 73   Resp: 14   Temp: 99.1 °F (37.3 °C)     Physical Examination:   Physical Exam  Vitals reviewed.   Constitutional:       General: She is not in acute distress.     Appearance: Normal appearance. She is obese. She is not ill-appearing, toxic-appearing or diaphoretic.   HENT:      Head: Normocephalic and atraumatic.      Right Ear: External ear normal.      Left Ear: External ear normal.   Eyes:      General: No scleral icterus.        Right eye: No discharge.         Left eye: No discharge.      Extraocular Movements: Extraocular movements intact.      Conjunctiva/sclera: Conjunctivae normal.      Pupils: Pupils are equal, round, and reactive to light.   Cardiovascular:      Rate and Rhythm: Normal rate and regular rhythm.      Pulses: Normal pulses.      Heart sounds: Normal heart sounds. No murmur heard.     No friction rub. No gallop.   Pulmonary:      Effort: Pulmonary effort is normal. No respiratory distress.      Breath sounds: Normal breath sounds. No wheezing, rhonchi or rales.   Abdominal:      General: Abdomen is flat. Bowel sounds are normal. There is no distension.      Palpations: Abdomen is soft.      Tenderness: There is no abdominal tenderness. There is no guarding.   Musculoskeletal:         General: No swelling, tenderness, deformity or signs of injury. Normal range of motion.       "Right lower leg: No edema.      Left lower leg: Edema (1+, pitting) present.      Comments: Limited ROM when turning head to left side; only able to rotate head to about 30-35 degrees with both passive and active movement   Skin:     General: Skin is warm and dry.      Capillary Refill: Capillary refill takes less than 2 seconds.      Coloration: Skin is not jaundiced.      Findings: No bruising, erythema or rash.   Neurological:      Mental Status: She is alert.      Comments: AAO to person, place, time, and situation; CN II-XII grossly intact         PREVENTIVE CARE:  Lab Work:    DM (age>45 or HTN):  Lab Results   Component Value Date    HGBA1C 5.2 04/10/2023    GEQBFNU5R 5.9 10/24/2023     Lipid :  Lab Results   Component Value Date    CHOL 183 11/17/2023    TRIG 62 11/17/2023    HDL 56 11/17/2023    .00 11/17/2023     Thyroid:  Lab Results   Component Value Date    TSH 2.916 11/17/2023    T4 8.85 03/24/2022     Screening:    Mammo:  Mammogram (12/14/2023) showed no suspicious masses, asymmetry, distortion, or calcifications; we will repeat in 2 years  DEXA (age>65 or FRAX > 9.3%): Not indicated at this time due to age and low FRAX score  Lung Ca (30PY smoker age 55-79 or quit in last 15y): Not indicated; patient does not meet 30 PY  Colon Ca: (age 45-75-annual FOBT, 5y flex + FOBTq3 or 10y c-scope): Colonoscopy (10/06/2023) showed multiple polyps with subsequent polypectomy; will repeat colonoscopy in 5 years  AAA (smoker 65-76): Not indicated at this time due to assigned sex    ID Titers and Vaccinations:    Immunization History   Administered Date(s) Administered    COVID-19, MRNA, LN-S, PF (Pfizer) (Gray Cap) 02/12/2022    COVID-19, MRNA, LN-S, PF (Pfizer) (Purple Cap) 07/15/2021, 08/05/2021    Hepatitis B, Adult 07/11/2013, 08/14/2013    Influenza - Trivalent - PF (ADULT) 04/01/2024    Zoster 01/13/2024    Zoster Recombinant 04/01/2024      HIV (once age 15-65):  No results found for: "HIV1X2", " ""[id number]"     Hepatitis Screening: No results found for: "HAV", "HEPAIGM", "HEPBIGM", "HEPBCAB", "HBEAG", "HEPCAB"   Pneumococcal vaccine (65 and over or high risk): Not indicated at this time due to age  Influenza Vaccine: UTD  Tetanus: UTD; will need booster in 2034  Zoster vaccination (> 50y.o): UTD  Covid vaccine: Refused  RSV: UTD  HPV: Negative in past; awaiting pap smear results  PAP Smear: Pap smear performed (10/19/2023); awaiting results; hx of abnormal pap smear    ASSESSMENT/PLAN:    Obesity  BMI 35.78  Hemoglobin A1c 5.5  -patient has lost twelve pounds since last visit  -educated and discussed dietary changes and exercise regimen  -patient would like to avoid weight loss medication at this time  -patient was referred to and evaluated by physical therapy who discharged without intervention as patient met appropriate milestones per patient report  -will refer to [facility] for continued assistance with lifestyle modifications    Lower extremity edema  -previously attributed to medication adverse effect at which time amlodipine was discontinued  -patient continues to have nonpitting LLE edema to left calf    Chronic neck pain  Chronic back pain  Chronic knee pain  -pain 2/2 work place fall in past   -see mri cervical spine without contrast (12/29/2022)   -follows with orthopedics  -follows with pain management specialist  -on baclofen 10 mg bid, celecoxib 200 mg qd, gabapentin 400 mg tid, duloxetine 60 mg q.d. and tylenol #4 by pain management  -norco 10 mg q.d. discontinued  -continue management per pain management and Orthopedics    Hypertension  /80  -amlodipine discontinued due to lower extremity edema  -lisinopril discontinued due to chronic cough  -continue losartan 50 mg q.d.    Duane's syndrome OS, type I  Optic disc drusen OS  -fundus exam normal  -B scan (03/2021) showed drusen OS  -MRI and MRV (2012) wnl  -HVF wnl in past  -continues to follow with [facility] ophthalmology  -continue " management per ophthalmology    Anxiety  Depression  -follows with outpatient psychiatrist  -on BuSpar 5 mg qd  -continue management per Psychiatry     Alopecia areata  -follows with [facility]  -receiving intradermal kenalog injections by [facility]  -continue management per dermatology    Pubic boil (resolved)  -patient reports resolution of symptoms  -continue following and management per Ob/gyn    Seasonal allergies  -patient reports seasonal pattern of symptoms  -continue Flonase and loratadine 10 mg prn symptoms    Health Maintenance:  -cmp, cbc, urinalysis, lipid panel, A1c, tsh, free t4 ordered for next visit  -screenings UTD  -vaccinations UTD  -initiated and/or refilled medications as above    Follow-up in 6 months    Future Appointments   Date Time Provider Department Center   4/15/2024  1:30 PM PROVIDERS, JOHAN [place]   7/9/2024  2:30 PM SCHEDULE, [facility] MINOR SURGERY [facility] FM RES [place]   9/27/2024  9:15 AM Evan Casillas MD [facility] DERM [place]   10/24/2024  2:00 PM Deb Levine, TIAP [facility] GYN [place]     Will Rodriguez MD  [facility] Internal Medicine PGY-1

## 2024-04-10 NOTE — PROGRESS NOTES
I have reviewed the notes, assessments, and management plan performed by resident, I concur with his documentation of Keri Sahni.  Date of Service: 4/9/2024

## 2024-04-15 ENCOUNTER — OFFICE VISIT (OUTPATIENT)
Dept: OPHTHALMOLOGY | Facility: CLINIC | Age: 59
End: 2024-04-15
Payer: MEDICAID

## 2024-04-15 VITALS — WEIGHT: 183 LBS | HEIGHT: 60 IN | BODY MASS INDEX: 35.93 KG/M2

## 2024-04-15 DIAGNOSIS — H50.89 DUANE'S SYNDROME, UNSPECIFIED LATERALITY: ICD-10-CM

## 2024-04-15 DIAGNOSIS — Z96.1 PSEUDOPHAKIA, BOTH EYES: Primary | ICD-10-CM

## 2024-04-15 PROCEDURE — 99213 OFFICE O/P EST LOW 20 MIN: CPT | Mod: PBBFAC,PN

## 2024-04-15 RX ORDER — PHENYLEPH/TROPICAMIDE IN WATER 2.5 %-1 %
1 DROPS OPHTHALMIC (EYE) ONCE
Status: COMPLETED | OUTPATIENT
Start: 2024-04-15 | End: 2024-04-15

## 2024-04-15 RX ADMIN — Medication 1 DROP: at 03:04

## 2024-04-15 NOTE — PROGRESS NOTES
Assessment /Plan     For exam results, see Encounter Report.    Pseudophakia, both eyes  -     tropicamide /PHENYLephrine opthalmic solution 1 drop    Duane's syndrome, unspecified laterality      Pseudophakia, [facility]  - doing well monitor    2. Duane's syndrome OS, Type 1  - no issues in primary gaze, denies diplopia  - monitor    3. Optic disc drusen OS  - MRI and MRV in 2012 which was wnl. HVF was wnl in past  - Red free fundus photos taken in past to document  - B scan 3/2021 showing drusen OS  - 4/15/24: DFE and photos stable today   - monitor    RTC 1 year for annual DFE

## 2024-04-18 DIAGNOSIS — J45.909 ASTHMA DUE TO SEASONAL ALLERGIES: ICD-10-CM

## 2024-04-18 RX ORDER — ALBUTEROL SULFATE 90 UG/1
AEROSOL, METERED RESPIRATORY (INHALATION)
Qty: 18 G | Refills: 3 | Status: CANCELLED | OUTPATIENT
Start: 2024-04-18

## 2024-05-08 ENCOUNTER — TELEPHONE (OUTPATIENT)
Dept: INTERNAL MEDICINE | Facility: CLINIC | Age: 59
End: 2024-05-08
Payer: MEDICAID

## 2024-05-08 NOTE — TELEPHONE ENCOUNTER
PRIOR AUTHORIZATION:  ALBUTEROL    PA ATTEMPT DENIED. MAXIMUM OF 6 PER YEAR HAS BEEN EXCEEDED.  CMM WILL FAX OVER A LIST OF ACCEPTABLE DIAGNOSIS.

## 2024-07-09 ENCOUNTER — PATIENT MESSAGE (OUTPATIENT)
Facility: CLINIC | Age: 59
End: 2024-07-09
Payer: MEDICAID

## 2024-08-20 ENCOUNTER — PATIENT MESSAGE (OUTPATIENT)
Facility: CLINIC | Age: 59
End: 2024-08-20
Payer: MEDICAID

## 2024-08-26 ENCOUNTER — TELEPHONE (OUTPATIENT)
Dept: NUTRITION | Facility: HOSPITAL | Age: 59
End: 2024-08-26
Payer: MEDICAID

## 2024-08-26 NOTE — TELEPHONE ENCOUNTER
RDN returned pt's call to reschedule nutrition appointment. No ans; left vm with contact information

## 2024-08-28 ENCOUNTER — OFFICE VISIT (OUTPATIENT)
Dept: URGENT CARE | Facility: CLINIC | Age: 59
End: 2024-08-28
Payer: MEDICAID

## 2024-08-28 VITALS
RESPIRATION RATE: 18 BRPM | HEART RATE: 72 BPM | OXYGEN SATURATION: 98 % | HEIGHT: 62 IN | TEMPERATURE: 98 F | SYSTOLIC BLOOD PRESSURE: 143 MMHG | DIASTOLIC BLOOD PRESSURE: 88 MMHG | BODY MASS INDEX: 32.74 KG/M2 | WEIGHT: 177.94 LBS

## 2024-08-28 DIAGNOSIS — B96.89 SINUSITIS, BACTERIAL: Primary | ICD-10-CM

## 2024-08-28 DIAGNOSIS — J32.9 SINUSITIS, BACTERIAL: Primary | ICD-10-CM

## 2024-08-28 PROCEDURE — 99215 OFFICE O/P EST HI 40 MIN: CPT | Mod: PBBFAC | Performed by: FAMILY MEDICINE

## 2024-08-28 PROCEDURE — 99213 OFFICE O/P EST LOW 20 MIN: CPT | Mod: S$PBB,,, | Performed by: FAMILY MEDICINE

## 2024-08-28 RX ORDER — BUPROPION HYDROCHLORIDE 150 MG/1
150 TABLET ORAL
COMMUNITY
Start: 2024-08-07

## 2024-08-28 RX ORDER — HYDROQUINONE 40 MG/G
CREAM TOPICAL 2 TIMES DAILY
COMMUNITY
Start: 2024-07-22

## 2024-08-28 RX ORDER — AMOXICILLIN AND CLAVULANATE POTASSIUM 875; 125 MG/1; MG/1
1 TABLET, FILM COATED ORAL 2 TIMES DAILY
Qty: 14 TABLET | Refills: 0 | Status: SHIPPED | OUTPATIENT
Start: 2024-08-28 | End: 2024-09-04

## 2024-08-28 RX ORDER — HYDROCODONE BITARTRATE AND ACETAMINOPHEN 10; 325 MG/1; MG/1
1 TABLET ORAL
COMMUNITY
Start: 2024-08-19

## 2024-08-28 NOTE — PROGRESS NOTES
"Subjective:       Patient ID: Keri Sahni is a 59 y.o. female.    Vitals:  height is 5' 2" (1.575 m) and weight is 80.7 kg (177 lb 14.6 oz). Her temperature is 98.4 °F (36.9 °C). Her blood pressure is 143/88 (abnormal) and her pulse is 72. Her respiration is 18 and oxygen saturation is 98%.     Chief Complaint: URI (Cough x 2wks)    2 weeks of worsening sinus pressure, postnasal drip, no fever    URI   There has been no fever. Associated symptoms include congestion, ear pain, rhinorrhea and sinus pain. Pertinent negatives include no chest pain, conjunctivitis, coughing, joint swelling, neck pain, rash, sore throat, swollen glands, vomiting or wheezing.         HENT:  Positive for ear pain, congestion and sinus pain. Negative for sore throat.    Neck: Negative for neck pain.   Cardiovascular:  Negative for chest pain.   Respiratory:  Negative for cough and wheezing.    Gastrointestinal:  Negative for vomiting.   Skin:  Negative for rash.       Objective:   Physical Exam   Constitutional: She appears well-developed.  Non-toxic appearance. She does not appear ill. No distress.   HENT:   Head: Atraumatic.   Nose: No purulent discharge. Right sinus exhibits maxillary sinus tenderness. Right sinus exhibits no frontal sinus tenderness. Left sinus exhibits maxillary sinus tenderness. Left sinus exhibits no frontal sinus tenderness.   Mouth/Throat: Uvula is midline.   Eyes: Right eye exhibits no discharge. Left eye exhibits no discharge. Extraocular movement intact   Neck: Neck supple. No neck rigidity present.   Cardiovascular: Regular rhythm.   Pulmonary/Chest: Effort normal and breath sounds normal. No respiratory distress. She has no wheezes. She has no rhonchi. She has no rales.   Lymphadenopathy:     She has no cervical adenopathy.   Neurological: She is alert.   Skin: Skin is warm, dry and not diaphoretic.   Psychiatric: Her behavior is normal.   Nursing note and vitals reviewed.        Assessment:     1. " Sinusitis, bacterial          Plan:   Will treat as presumed bacterial sinusitis.  Take medications as prescribed.  Consider intranasal steroids like Flonase and other over-the-counter medications to treat your symptoms.  Consider saline nasal rinses.      Please follow instructions on patient education material.  Follow up with your PCP or return to urgent care in 4-5 days if symptoms are not improving. Seek care immediately if new or worsening symptoms develop.    Sinusitis, bacterial  -     amoxicillin-clavulanate 875-125mg (AUGMENTIN) 875-125 mg per tablet; Take 1 tablet by mouth 2 (two) times daily. for 7 days  Dispense: 14 tablet; Refill: 0        Please note: This chart was completed via voice to text dictation. It may contain typographical/word recognition errors. If there are any questions, please contact the provider for final clarification.

## 2024-09-18 ENCOUNTER — NUTRITION (OUTPATIENT)
Dept: NUTRITION | Facility: HOSPITAL | Age: 59
End: 2024-09-18
Payer: MEDICAID

## 2024-09-18 DIAGNOSIS — E66.01 CLASS 3 SEVERE OBESITY DUE TO EXCESS CALORIES WITH SERIOUS COMORBIDITY AND BODY MASS INDEX (BMI) OF 40.0 TO 44.9 IN ADULT: ICD-10-CM

## 2024-09-18 PROCEDURE — 97804 MEDICAL NUTRITION GROUP: CPT

## 2024-09-18 NOTE — PROGRESS NOTES
Healthy Eating Nutrition Group Class    Keri Yajaira Sahni  1965     Referring Provider:  Will Rodriguez MD   Referred for: Weight Management    Nutrition Diagnosis      PES:  Food and nutrition related knowledge deficit related to limited prior nutrition related knowledge as evidenced by health professional referral for nutrition education        Nutrition Intervention:  Content related nutrition education     MNT Education Completed on: Healthy Eating     Instructed patient on heart healthy eating and weight management; low fat, cholesterol, and sodium foods; better food choices; portion sizes; healthy choices when cooking and / or eating out; reading food labels; increasing activity.        Teaching Method:  Demonstration, Explanation, and Printed Materials    Patient's Understanding: Verbalizes understanding    Barriers to learning: None evident    Expected Compliance:  good    Education Materials Provided: Healthy Eating Nutrition Class Handout    All questions were answered. Dietitian's contact information provided.       Nutrition Prescription:  General Healthful Diet       Goal:  Patient will adhere to prescribed MNT meal plan as instructed by RD        Monitoring / Evaluation      R.D. will monitor: PRN            Face to Face Time: 60 minutes (9:00 am - 10:00 am)

## 2024-10-11 ENCOUNTER — OFFICE VISIT (OUTPATIENT)
Dept: DERMATOLOGY | Facility: CLINIC | Age: 59
End: 2024-10-11
Payer: MEDICAID

## 2024-10-11 ENCOUNTER — LAB VISIT (OUTPATIENT)
Dept: LAB | Facility: HOSPITAL | Age: 59
End: 2024-10-11
Payer: MEDICAID

## 2024-10-11 VITALS
WEIGHT: 176.38 LBS | DIASTOLIC BLOOD PRESSURE: 76 MMHG | BODY MASS INDEX: 32.46 KG/M2 | OXYGEN SATURATION: 98 % | HEIGHT: 62 IN | TEMPERATURE: 99 F | SYSTOLIC BLOOD PRESSURE: 136 MMHG | HEART RATE: 75 BPM

## 2024-10-11 DIAGNOSIS — B35.3 TINEA PEDIS OF LEFT FOOT: ICD-10-CM

## 2024-10-11 DIAGNOSIS — E66.813 CLASS 3 SEVERE OBESITY DUE TO EXCESS CALORIES WITH SERIOUS COMORBIDITY AND BODY MASS INDEX (BMI) OF 40.0 TO 44.9 IN ADULT: ICD-10-CM

## 2024-10-11 DIAGNOSIS — E66.01 CLASS 3 SEVERE OBESITY DUE TO EXCESS CALORIES WITH SERIOUS COMORBIDITY AND BODY MASS INDEX (BMI) OF 40.0 TO 44.9 IN ADULT: ICD-10-CM

## 2024-10-11 DIAGNOSIS — L66.81 CENTRAL CENTRIFUGAL CICATRICIAL ALOPECIA: Primary | ICD-10-CM

## 2024-10-11 LAB
ALBUMIN SERPL-MCNC: 4 G/DL (ref 3.4–4.8)
ALBUMIN/GLOB SERPL: 1.3 RATIO (ref 1.1–2)
ALP SERPL-CCNC: 76 UNIT/L (ref 40–150)
ALT SERPL-CCNC: 11 UNIT/L (ref 0–55)
ANION GAP SERPL CALC-SCNC: 7 MEQ/L
AST SERPL-CCNC: 15 UNIT/L (ref 5–34)
BACTERIA #/AREA URNS AUTO: ABNORMAL /HPF
BASOPHILS # BLD AUTO: 0.05 X10(3)/MCL
BASOPHILS NFR BLD AUTO: 0.6 %
BILIRUB SERPL-MCNC: 0.4 MG/DL
BILIRUB UR QL STRIP.AUTO: NEGATIVE
BUN SERPL-MCNC: 10.7 MG/DL (ref 9.8–20.1)
CALCIUM SERPL-MCNC: 9.9 MG/DL (ref 8.4–10.2)
CHLORIDE SERPL-SCNC: 107 MMOL/L (ref 98–107)
CHOLEST SERPL-MCNC: 189 MG/DL
CHOLEST/HDLC SERPL: 4 {RATIO} (ref 0–5)
CLARITY UR: CLEAR
CO2 SERPL-SCNC: 29 MMOL/L (ref 23–31)
COLOR UR AUTO: YELLOW
CREAT SERPL-MCNC: 0.77 MG/DL (ref 0.55–1.02)
CREAT UR-MCNC: 203.1 MG/DL (ref 45–106)
CREAT/UREA NIT SERPL: 14
EOSINOPHIL # BLD AUTO: 0.31 X10(3)/MCL (ref 0–0.9)
EOSINOPHIL NFR BLD AUTO: 3.9 %
ERYTHROCYTE [DISTWIDTH] IN BLOOD BY AUTOMATED COUNT: 13.6 % (ref 11.5–17)
EST. AVERAGE GLUCOSE BLD GHB EST-MCNC: 102.5 MG/DL
GFR SERPLBLD CREATININE-BSD FMLA CKD-EPI: >60 ML/MIN/1.73/M2
GLOBULIN SER-MCNC: 3.1 GM/DL (ref 2.4–3.5)
GLUCOSE SERPL-MCNC: 113 MG/DL (ref 82–115)
GLUCOSE UR QL STRIP: NORMAL
HBA1C MFR BLD: 5.2 %
HCT VFR BLD AUTO: 37.7 % (ref 37–47)
HDLC SERPL-MCNC: 48 MG/DL (ref 35–60)
HGB BLD-MCNC: 13.8 G/DL (ref 12–16)
HGB UR QL STRIP: NEGATIVE
HYALINE CASTS #/AREA URNS LPF: ABNORMAL /LPF
IMM GRANULOCYTES # BLD AUTO: 0.02 X10(3)/MCL (ref 0–0.04)
IMM GRANULOCYTES NFR BLD AUTO: 0.2 %
KETONES UR QL STRIP: NEGATIVE
LDLC SERPL CALC-MCNC: 120 MG/DL (ref 50–140)
LEUKOCYTE ESTERASE UR QL STRIP: NEGATIVE
LYMPHOCYTES # BLD AUTO: 2.45 X10(3)/MCL (ref 0.6–4.6)
LYMPHOCYTES NFR BLD AUTO: 30.4 %
MCH RBC QN AUTO: 29.1 PG (ref 27–31)
MCHC RBC AUTO-ENTMCNC: 36.6 G/DL (ref 33–36)
MCV RBC AUTO: 79.4 FL (ref 80–94)
MONOCYTES # BLD AUTO: 0.59 X10(3)/MCL (ref 0.1–1.3)
MONOCYTES NFR BLD AUTO: 7.3 %
MUCOUS THREADS URNS QL MICRO: ABNORMAL /LPF
NEUTROPHILS # BLD AUTO: 4.63 X10(3)/MCL (ref 2.1–9.2)
NEUTROPHILS NFR BLD AUTO: 57.6 %
NITRITE UR QL STRIP: NEGATIVE
NRBC BLD AUTO-RTO: 0 %
PH UR STRIP: 5.5 [PH]
PLATELET # BLD AUTO: 204 X10(3)/MCL (ref 130–400)
PMV BLD AUTO: 11.4 FL (ref 7.4–10.4)
POTASSIUM SERPL-SCNC: 3.8 MMOL/L (ref 3.5–5.1)
PROT SERPL-MCNC: 7.1 GM/DL (ref 5.8–7.6)
PROT UR QL STRIP: ABNORMAL
PROT UR STRIP-MCNC: 12.1 MG/DL
RBC # BLD AUTO: 4.75 X10(6)/MCL (ref 4.2–5.4)
RBC #/AREA URNS AUTO: ABNORMAL /HPF
SODIUM SERPL-SCNC: 143 MMOL/L (ref 136–145)
SP GR UR STRIP.AUTO: 1.03 (ref 1–1.03)
SQUAMOUS #/AREA URNS LPF: ABNORMAL /HPF
T4 FREE SERPL-MCNC: 1.02 NG/DL (ref 0.7–1.48)
TRIGL SERPL-MCNC: 107 MG/DL (ref 37–140)
TSH SERPL-ACNC: 4.43 UIU/ML (ref 0.35–4.94)
URINE PROTEIN/CREATININE RATIO (OLG): 0.1
UROBILINOGEN UR STRIP-ACNC: ABNORMAL
VLDLC SERPL CALC-MCNC: 21 MG/DL
WBC # BLD AUTO: 8.05 X10(3)/MCL (ref 4.5–11.5)
WBC #/AREA URNS AUTO: ABNORMAL /HPF

## 2024-10-11 PROCEDURE — 99213 OFFICE O/P EST LOW 20 MIN: CPT | Mod: PBBFAC | Performed by: DERMATOLOGY

## 2024-10-11 PROCEDURE — 81001 URINALYSIS AUTO W/SCOPE: CPT

## 2024-10-11 PROCEDURE — 36415 COLL VENOUS BLD VENIPUNCTURE: CPT

## 2024-10-11 PROCEDURE — 80053 COMPREHEN METABOLIC PANEL: CPT

## 2024-10-11 PROCEDURE — 84156 ASSAY OF PROTEIN URINE: CPT

## 2024-10-11 PROCEDURE — 82570 ASSAY OF URINE CREATININE: CPT

## 2024-10-11 PROCEDURE — 80061 LIPID PANEL: CPT

## 2024-10-11 PROCEDURE — 84443 ASSAY THYROID STIM HORMONE: CPT

## 2024-10-11 PROCEDURE — 84439 ASSAY OF FREE THYROXINE: CPT

## 2024-10-11 PROCEDURE — 83036 HEMOGLOBIN GLYCOSYLATED A1C: CPT

## 2024-10-11 PROCEDURE — 85025 COMPLETE CBC W/AUTO DIFF WBC: CPT

## 2024-10-11 RX ORDER — TACROLIMUS 1 MG/G
OINTMENT TOPICAL 2 TIMES DAILY
Qty: 60 G | Refills: 5 | Status: SHIPPED | OUTPATIENT
Start: 2024-10-11

## 2024-10-11 RX ORDER — CICLOPIROX OLAMINE 7.7 MG/100ML
SUSPENSION TOPICAL
Qty: 60 ML | Refills: 5 | Status: SHIPPED | OUTPATIENT
Start: 2024-10-11

## 2024-10-11 NOTE — PROGRESS NOTES
"[facility] DERMATOLOGY OFFICE VISIT NOTE  Keri Sahni  [id number]  10/11/2024      Chief Complaint: Follow-up (Hows concerns about L foot great toe.  Questions about meds)      HPI:    Keri Sahni is a 59 y.o. female  presenting to [facility] alopecia.    Last visit: 9/29/2023    C/o hyperpigmentation of bilateral big toe. Noticed after wearing tennis shoes. Saw a dermatologist and told to use deodarant on feet which she continues to use. Condition improved. She noticed her other toe nails returned to normal color, however the left great toe nail never cleared.    [place]: noticed some hair growth. Continues ketoconazole and betamethasone cream to scalp. Noticing less hair loss. Last Intralesional injection 1/2024.    Hyperpigmentation of nasal fold: using hydroquinone 4% cream. resolved    ROS:  Gen: denies fever or chills  Cardio: denies chest pain  Resp: denies shortness of breath  Skin: as per HPI    PE:  Vitals:    10/11/24 0855   BP: 136/76   Pulse: 75   Temp: 98.7 °F (37.1 °C)   TempSrc: Oral   SpO2: 98%   Weight: 80 kg (176 lb 6.4 oz)   Height: 5' 2" (1.575 m)      Gen: alert, no acute distress  Skin: alopecia of central scalp and hairline with scarring. Hyperpigmentation of the left great toe nail.   Psych: cooperative, appropriate mood and affect          Assessment:   1. Central centrifugal cicatricial alopecia    2. Tinea pedis of left foot        Plan:  Improvement in hair density, but noted areas of scarring  Instructed patient to use tacrolimus ointment to scalp on week days and betamethasone ointment on weekends to limit possible skin thinning effects of chronic topical steroid use. If itching become problematic, patient can use betamethasone during he week as well  Patient can also return to minor procedure clinic for intralesional injections of scalp to help prevent inflammatory response of hair follicles.  Apply a thin coat of loprox nightly to affected toe nail. Remove with nail " polish remover after 7 days, continue for 1 year    Return to clinic in 1 year for derm clinic. Patient can call for a sooner appointment with [facility] for intralesional injections      Ian Martin DO  [facility] Resident, [facility]

## 2024-10-24 ENCOUNTER — OFFICE VISIT (OUTPATIENT)
Dept: INTERNAL MEDICINE | Facility: CLINIC | Age: 59
End: 2024-10-24
Payer: MEDICAID

## 2024-10-24 ENCOUNTER — OFFICE VISIT (OUTPATIENT)
Dept: GYNECOLOGY | Facility: CLINIC | Age: 59
End: 2024-10-24
Payer: MEDICAID

## 2024-10-24 VITALS
TEMPERATURE: 98 F | OXYGEN SATURATION: 98 % | WEIGHT: 174.38 LBS | HEIGHT: 62 IN | HEART RATE: 70 BPM | BODY MASS INDEX: 32.09 KG/M2 | DIASTOLIC BLOOD PRESSURE: 79 MMHG | RESPIRATION RATE: 18 BRPM | SYSTOLIC BLOOD PRESSURE: 117 MMHG

## 2024-10-24 VITALS
WEIGHT: 174.38 LBS | DIASTOLIC BLOOD PRESSURE: 76 MMHG | SYSTOLIC BLOOD PRESSURE: 121 MMHG | TEMPERATURE: 99 F | OXYGEN SATURATION: 95 % | HEART RATE: 75 BPM | HEIGHT: 62 IN | RESPIRATION RATE: 16 BRPM | BODY MASS INDEX: 32.09 KG/M2

## 2024-10-24 DIAGNOSIS — E66.813 CLASS 3 SEVERE OBESITY DUE TO EXCESS CALORIES WITH SERIOUS COMORBIDITY AND BODY MASS INDEX (BMI) OF 40.0 TO 44.9 IN ADULT: ICD-10-CM

## 2024-10-24 DIAGNOSIS — I10 PRIMARY HYPERTENSION: Primary | ICD-10-CM

## 2024-10-24 DIAGNOSIS — Z12.31 SCREENING MAMMOGRAM FOR BREAST CANCER: ICD-10-CM

## 2024-10-24 DIAGNOSIS — E66.01 CLASS 3 SEVERE OBESITY DUE TO EXCESS CALORIES WITH SERIOUS COMORBIDITY AND BODY MASS INDEX (BMI) OF 40.0 TO 44.9 IN ADULT: ICD-10-CM

## 2024-10-24 DIAGNOSIS — I10 HYPERTENSION, UNSPECIFIED TYPE: ICD-10-CM

## 2024-10-24 DIAGNOSIS — Z01.419 WOMEN'S ANNUAL ROUTINE GYNECOLOGICAL EXAMINATION: Primary | ICD-10-CM

## 2024-10-24 PROCEDURE — 1159F MED LIST DOCD IN RCRD: CPT | Mod: CPTII,,, | Performed by: NURSE PRACTITIONER

## 2024-10-24 PROCEDURE — 3008F BODY MASS INDEX DOCD: CPT | Mod: CPTII,,, | Performed by: NURSE PRACTITIONER

## 2024-10-24 PROCEDURE — 3044F HG A1C LEVEL LT 7.0%: CPT | Mod: CPTII,,, | Performed by: NURSE PRACTITIONER

## 2024-10-24 PROCEDURE — 99215 OFFICE O/P EST HI 40 MIN: CPT | Mod: PBBFAC | Performed by: NURSE PRACTITIONER

## 2024-10-24 PROCEDURE — 99215 OFFICE O/P EST HI 40 MIN: CPT | Mod: PBBFAC,27

## 2024-10-24 PROCEDURE — 3078F DIAST BP <80 MM HG: CPT | Mod: CPTII,,, | Performed by: NURSE PRACTITIONER

## 2024-10-24 PROCEDURE — 99396 PREV VISIT EST AGE 40-64: CPT | Mod: S$PBB,,, | Performed by: NURSE PRACTITIONER

## 2024-10-24 PROCEDURE — 4010F ACE/ARB THERAPY RXD/TAKEN: CPT | Mod: CPTII,,, | Performed by: NURSE PRACTITIONER

## 2024-10-24 PROCEDURE — 3074F SYST BP LT 130 MM HG: CPT | Mod: CPTII,,, | Performed by: NURSE PRACTITIONER

## 2024-10-24 PROCEDURE — 1160F RVW MEDS BY RX/DR IN RCRD: CPT | Mod: CPTII,,, | Performed by: NURSE PRACTITIONER

## 2024-10-24 RX ORDER — BUTALBITAL, ACETAMINOPHEN AND CAFFEINE 50; 325; 40 MG/1; MG/1; MG/1
1 TABLET ORAL EVERY 4 HOURS PRN
COMMUNITY

## 2024-10-24 NOTE — PROGRESS NOTES
[facility] INTERNAL MEDICINE CLINIC NOTE    Patient Name: Keri Sahni  YOB: 1965   MRN: [id number]  Appointment Date: 10/24/2024  AppointmentTime: 02:35 PM    Subjective     HPI    Keri Sahni is a 59 y.o.  female with PMH positive for obesity, allergies, alopecia areata, abnormal uterine bleeding s/p supracervical hysterectomy with BSO, anxiety, depression, who presented to  clinic today for follow up visit. Reports she has been following with [facility] nutrition. Delighted to find out that she has lost another five pounds over the past several months. Continues to place an emphasis on improving her diet since our last discussion by attempting to minimize intake processed foods, fast food, and even canned goods due to the amount of salt in canned goods. Patient denies any complaints at this time.    Interval History    N/A    Past Medical History:  Past Medical History:   Diagnosis Date    Back injury     Benign essential HTN     Depression     Insomnia     Knee pain     Obesity     Pregnancy     Sinusitis      Past Surgical History:  Past Surgical History:   Procedure Laterality Date    APPENDECTOMY      CATARACT EXTRACTION Left 04/01/2021    CATARACT EXTRACTION Right 11/19/2020    COLONOSCOPY, WITH POLYPECTOMY USING SNARE N/A 10/6/2023    Procedure: COLONOSCOPY, WITH POLYPECTOMY USING SNARE;  Surgeon: Juan Vyas MD;  Location: [facility] ENDOSCOPY;  Service: General;  Laterality: N/A;  hot    HEEL SPUR SURGERY      HYSTERECTOMY      KNEE SURGERY      NECK SURGERY      TONSILLECTOMY       Family History:  Family History   Problem Relation Name Age of Onset    Diabetes Mother      Hypertension Mother      Diabetes Father      Cataracts Father      Glaucoma Father      Hypertension Father      Cataracts Sister      Glaucoma Sister      Hypertension Sister      Stroke Sister      Thyroid disease Sister      No Known Problems Brother      No Known Problems Maternal Aunt      Prostate  cancer Maternal Uncle      No Known Problems Paternal Aunt      No Known Problems Paternal Uncle      Blindness Maternal Grandmother      Glaucoma Maternal Grandmother      No Known Problems Maternal Grandfather      No Known Problems Paternal Grandmother      No Known Problems Paternal Grandfather      Stroke Cousin      Amblyopia Neg Hx      Macular degeneration Neg Hx      Retinal detachment Neg Hx      Strabismus Neg Hx       Social History:  Social History     Tobacco Use    Smoking status: Former     Current packs/day: 0.00     Types: Cigarettes     Quit date: 10/1/2016     Years since quittin.0     Passive exposure: Past    Smokeless tobacco: Former     Quit date: 10/1/2016   Substance Use Topics    Alcohol use: Not Currently    Drug use: Never     Allergies:  Review of patient's allergies indicates:  No Known Allergies  Home Medications:  Prior to Admission medications    Medication Sig Start Date End Date Taking? Authorizing Provider   albuterol (PROVENTIL/VENTOLIN HFA) 90 mcg/actuation inhaler See Instructions, INHALE 2 PUFFS BY MOUTH EVERY 6 HOURS AS NEEDED FOR WHEEZING, # 2 EA, 6 Refill(s), Pharmacy: [id number], 159, cm, Height/Length Dosing, 21 14:36:00 CDT, 90.5, kg, Weight Dosing, 21 14:36:00 CDT 23   Will Rodriguez MD   amLODIPine (NORVASC) 5 MG tablet Take 5 mg by mouth. 23   Provider, Historical   baclofen (LIORESAL) 10 MG tablet Take 10 mg by mouth 2 (two) times daily. 23   Provider, Historical   betamethasone dipropionate 0.05 % cream Apply topically 2 (two) times daily.    Provider, Historical   busPIRone (BUSPAR) 5 MG Tab Take 5 mg by mouth 3 (three) times daily. 23   Provider, Historical   celecoxib (CELEBREX) 200 MG capsule Take 200 mg by mouth. 23   Provider, Historical   DULoxetine (CYMBALTA) 60 MG capsule Take 120 mg by mouth. 23   Provider, Historical   fluocinonide (LIDEX) 0.05 % external solution Apply topically 2 (two) times daily  as needed (Itching). 9/29/23   Trisha Smalls MD   fluticasone propionate (FLONASE) 50 mcg/actuation nasal spray 1 spray (50 mcg total) by Each Nostril route once daily. 8/29/23   Will Rodriguez MD   gabapentin (NEURONTIN) 400 MG capsule Take 400 mg by mouth 3 (three) times daily. 8/22/23   Provider, Historical   guaiFENesin (MUCINEX) 600 mg 12 hr tablet Take 600 mg by mouth. 8/4/21   Provider, Historical   HYDROcodone-acetaminophen (NORCO)  mg per tablet Take by mouth. 8/22/23   Provider, Historical   ketoconazole (NIZORAL) 2 % cream Apply topically once daily. Apply for 4 weeks only 9/29/23   Trisha Smalls MD   ketoconazole (NIZORAL) 2 % shampoo Apply topically twice a week. 10/2/23   Trisha Smalls MD   loratadine (CLARITIN) 10 mg tablet Take 1 tablet (10 mg total) by mouth once daily. 1/17/23   Trisha Smalls MD   losartan (COZAAR) 25 MG tablet Take 1 tablet (25 mg total) by mouth once daily. 1/17/23 1/17/24  Trisha Smalls MD   polyethylene glycol (MOVIPREP) 100-7.5-2.691 gram solution Take as directed per instructions provided by GI Clinic  Patient not taking: Reported on 10/19/2023 9/8/23   Francesca Rg FNP   promethazine-dextromethorphan (PROMETHAZINE-DM) 6.25-15 mg/5 mL Syrp Take 5 mLs by mouth every 6 to 8 hours as needed (cough). May cause sedation.  Do not drive or operate heavy machinery.  Patient not taking: Reported on 9/27/2023 6/12/23   Omar Marsh MD   sulfamethoxazole-trimethoprim 800-160mg (BACTRIM DS) 800-160 mg Tab Take 1 tablet by mouth 2 (two) times daily. for 7 days 10/19/23 10/26/23  Deb Levine FNP   traZODone (DESYREL) 100 MG tablet Take 100 mg by mouth every evening. 8/30/23   Provider, Historical     Review of Systems:  Review of Systems   All other systems reviewed and are negative.      Objective     Vitals:   Vitals:    10/24/24 1525   BP: 121/76   Pulse: 75   Resp: 16   Temp: 98.8 °F (37.1 °C)     Physical Examination:   Physical Exam  Vitals  reviewed.   Constitutional:       General: She is not in acute distress.     Appearance: Normal appearance. She is obese. She is not ill-appearing, toxic-appearing or diaphoretic.   HENT:      Head: Normocephalic and atraumatic.      Right Ear: External ear normal.      Left Ear: External ear normal.   Eyes:      General: No scleral icterus.        Right eye: No discharge.         Left eye: No discharge.      Extraocular Movements: Extraocular movements intact.      Conjunctiva/sclera: Conjunctivae normal.      Pupils: Pupils are equal, round, and reactive to light.   Cardiovascular:      Rate and Rhythm: Normal rate and regular rhythm.      Pulses: Normal pulses.      Heart sounds: Normal heart sounds. No murmur heard.     No friction rub. No gallop.   Pulmonary:      Effort: Pulmonary effort is normal. No respiratory distress.      Breath sounds: Normal breath sounds. No wheezing, rhonchi or rales.   Abdominal:      General: Abdomen is flat. Bowel sounds are normal. There is no distension.      Palpations: Abdomen is soft.      Tenderness: There is no abdominal tenderness. There is no guarding.   Musculoskeletal:         General: No swelling, tenderness, deformity or signs of injury. Normal range of motion.      Left lower leg: No edema.   Skin:     General: Skin is warm and dry.      Capillary Refill: Capillary refill takes less than 2 seconds.      Coloration: Skin is not jaundiced.      Findings: No bruising, erythema or rash.   Neurological:      Mental Status: She is alert.      Comments: AAO to person, place, time, and situation; CN II-XII grossly intact         PREVENTIVE CARE:  Lab Work:    DM (age>45 or HTN):  Lab Results   Component Value Date    HGBA1C 5.2 10/11/2024    UAHBUZF3C 5.9 10/24/2023     Lipid :  Lab Results   Component Value Date    CHOL 189 10/11/2024    TRIG 107 10/11/2024    HDL 48 10/11/2024    .00 10/11/2024     Thyroid:  Lab Results   Component Value Date    TSH 4.432 10/11/2024  "   T4 8.85 03/24/2022     Screening:    Mammo:  Mammogram (12/14/2023) showed no suspicious masses, asymmetry, distortion, or calcifications; we will repeat in 2025  DEXA (age>65 or FRAX > 9.3%): Not indicated at this time due to age and/or FRAX score  Lung Ca (30PY smoker age 55-79 or quit in last 15y): Not indicated as patient does not meet 20 pack year smoking history per patient description of smoking habits  Colon Ca: (age 45-75-annual FOBT, 5y flex + FOBTq3 or 10y c-scope): Colonoscopy (10/06/2023) showed multiple polyps with subsequent polypectomy; will repeat colonoscopy in 2028  AAA (smoker 65-76): Not indicated due to assigned sex    ID Titers and Vaccinations:    Immunization History   Administered Date(s) Administered    COVID-19, MRNA, LN-S, PF (Pfizer) (Gray Cap) 02/12/2022    COVID-19, MRNA, LN-S, PF (Pfizer) (Purple Cap) 07/15/2021, 08/05/2021    Hepatitis B, Adult 07/11/2013, 08/14/2013    Influenza - Trivalent - Fluarix, Flulaval, Fluzone, Afluria - PF 04/01/2024    Zoster 01/13/2024    Zoster Recombinant 04/01/2024      HIV (once age 15-65):  No results found for: "HIV1X2", "FAD42OGNN"     Hepatitis Screening: No results found for: "HAV", "HEPAIGM", "HEPBIGM", "HEPBCAB", "HBEAG", "HEPCAB"   Pneumococcal vaccine (65 and over or high risk): Not indicated at this time due to age  Influenza Vaccine: UTD  Tetanus: UTD  Zoster vaccination (> 50y.o): UTD  Covid vaccine: Received 4 doses of Pfizer  PAP: Pap smear (10/19/2023) NIL, HPV negative; hx of abnormal pap smear    ASSESSMENT/PLAN:    Obesity  BMI 31.90  Hemoglobin A1c 5.5  -patient has lost fifteen pounds since this year  -educated and discussed dietary changes and exercise regimen  -patient would like to avoid weight loss medication at this time  -patient was referred to and evaluated by physical therapy who discharged without intervention as patient met appropriate milestones per patient report  -follows with [facility] for continued " assistance with lifestyle modifications    Chronic neck pain  Chronic back pain  Chronic knee pain  -pain 2/2 work place fall in past   -see mri cervical spine without contrast (12/29/2022)   -follows with orthopedics  -follows with pain management specialist  -on baclofen 10 mg bid, celecoxib 200 mg qd, gabapentin 400 mg tid, duloxetine 60 mg q.d. and tylenol #4 by pain management  -norco 10 mg q.d. discontinued  -continue management per pain management and Orthopedics    Hypertension  /74  -goal bp < 130/80  -bp within goal  -amlodipine discontinued due to lower extremity edema; lower extremity swelling has since resolved  -lisinopril discontinued due to chronic cough  -continue losartan 50 mg q.d.    Duane's syndrome OS, type I  Optic disc drusen OS  -fundus exam normal  -B scan (03/2021) showed drusen OS  -MRI and MRV (2012) wnl  -HVF wnl in past  -continues to follow with [facility] ophthalmology  -continue management per ophthalmology    Anxiety  Depression  -follows with outpatient psychiatrist  -on BuSpar 5 mg qd  -continue management per Psychiatry     Alopecia areata  -follows with [facility]  -receiving intradermal kenalog injections by [facility]  -continue management per dermatology    Pubic boil (resolved)  -patient reports resolution of symptoms  -continue following and management per Ob/gyn    Seasonal allergies  -patient reports seasonal pattern of symptoms  -continue Flonase and loratadine 10 mg prn symptoms    Health Maintenance:  -lab work before next visit  -initiated and/or refilled medications as above  -screenings as above  -vaccinations as above    Follow-up in 6 months    Future Appointments   Date Time Provider Department Center   4/15/2025  2:30 PM PROVIDERS, JOHAN [place]   10/24/2025  8:00 AM Evan Casillas MD [facility] DERM [place]   10/28/2025  1:40 PM Deb Levine FNP [facility] GYN [place]     Will Rodriguez MD  [facility] Internal Medicine  [facility]

## 2024-11-07 DIAGNOSIS — L65.9 ALOPECIA: ICD-10-CM

## 2024-11-07 RX ORDER — BETAMETHASONE DIPROPIONATE 0.5 MG/G
CREAM TOPICAL 2 TIMES DAILY
Qty: 45 G | Refills: 2 | Status: SHIPPED | OUTPATIENT
Start: 2024-11-07

## 2024-12-10 DIAGNOSIS — J45.909 ASTHMA DUE TO SEASONAL ALLERGIES: ICD-10-CM

## 2024-12-10 RX ORDER — FLUTICASONE PROPIONATE 50 MCG
1 SPRAY, SUSPENSION (ML) NASAL DAILY
Qty: 16 G | Refills: 12 | Status: SHIPPED | OUTPATIENT
Start: 2024-12-10 | End: 2025-12-10

## 2025-02-06 ENCOUNTER — HOSPITAL ENCOUNTER (OUTPATIENT)
Dept: RADIOLOGY | Facility: HOSPITAL | Age: 60
Discharge: HOME | End: 2025-02-06
Attending: NURSE PRACTITIONER
Payer: MEDICAID

## 2025-02-06 DIAGNOSIS — Z12.31 SCREENING MAMMOGRAM FOR BREAST CANCER: ICD-10-CM

## 2025-02-06 PROCEDURE — 77063 BREAST TOMOSYNTHESIS BI: CPT | Mod: 26,,, | Performed by: RADIOLOGY

## 2025-02-06 PROCEDURE — 77067 SCR MAMMO BI INCL CAD: CPT | Mod: TC

## 2025-02-06 PROCEDURE — 77067 SCR MAMMO BI INCL CAD: CPT | Mod: 26,,, | Performed by: RADIOLOGY

## 2025-02-10 NOTE — PROGRESS NOTES
Subjective:      Patient ID: Keri Sahni is a 58 y.o. female.    Vitals:  height is 5' (1.524 m) and weight is 82.6 kg (182 lb). Her oral temperature is 98.7 °F (37.1 °C). Her blood pressure is 146/84 (abnormal) and her pulse is 84. Her respiration is 18 and oxygen saturation is 96%.     Chief Complaint: Cough (Patient reports cough, congestion, body aches, HA,  chest congestion x 3 days )    Cough     As stated in CC. Pt reports symptoms started 4 nights ago. Pt reports taking jcarlos-selzer OTC with minimal relief. Patient states she has been using her albuterol inhaler as needed more often for some wheezing. Pt reports she has been using it more often than usual. Pt denies fever, shortness of breath, headache.      Respiratory:  Positive for cough.    Skin:  Negative for erythema.      Objective:     Physical Exam   Constitutional: She is oriented to person, place, and time. She appears well-developed.  Non-toxic appearance. She does not appear ill. No distress.   HENT:   Head: Normocephalic and atraumatic.   Nose: Rhinorrhea and congestion present. No purulent discharge. Right sinus exhibits no maxillary sinus tenderness and no frontal sinus tenderness. Left sinus exhibits no maxillary sinus tenderness and no frontal sinus tenderness.   Mouth/Throat: Uvula is midline. Mucous membranes are moist. Posterior oropharyngeal erythema present. No oropharyngeal exudate.   Eyes: Conjunctivae are normal. Right eye exhibits no discharge. Left eye exhibits no discharge. Extraocular movement intact   Neck: Neck supple. No neck rigidity present.   Cardiovascular: Regular rhythm and normal pulses.   Pulmonary/Chest: Effort normal. No respiratory distress. She has wheezes. She has rhonchi. She has no rales. She exhibits no tenderness.         Comments: Posterior Left upper lobe,  wheezes on expiration.    Abdominal: Normal appearance. She exhibits no distension. There is no abdominal tenderness. There is no guarding, no left  Your Surgery date is Tuesday, 3/4/25, Dr. López's office will call and inform you of the arrival time the day of surgery.     Enter [facility] through the Main Entrance, take the lobby elevators to the second floor and check in at the Surgery Registration desk.     Continue to take your home medications as you normally do up to and including the night before surgery with the exception of blood thinning medications.    Blood Thinning Medications:  Please stop prescription blood thinning medications such as Apixaban (Eliquis); Clopidogrel (Plavix); Dabigatran (Pradaxa); Prasugrel (Effient); Rivaroxaban (Xarelto); Ticagrelor (Brilinta); Warfarin (Coumadin) only as directed by your surgeon and/or the prescribing physician    Some common examples of other medications that can thin your blood are: Aspirin, Ibuprofen (Advil, Motrin), Naproxen (Aleve), Meloxicam (Mobic), Celecoxib (Celebrex), Fish Oil, many Herbal Supplements.  These medications should usually be stopped at least 7 days prior to surgery.    Check with cardiology to see when to stop taking aspirin.    Tylenol is OK to take for pain the week prior to surgery.    Failure to stop certain medications may interfere with your scheduled surgery.    If you receive instructions from your surgeon regarding what medications to stop prior to surgery, please follow those specific instructions.    Please take the following medication(s) the day of surgery with small sips of water:              Metoprolol, duloxetine, pregabalin, buspirone, isosorbide.    If you are on medicare and there is a possibility that you will be admitted following surgery:   Please bring your prescription medications in their original bottles with pharmacy label on the day of surgery.    Showering Before Surgery:     You can play an important role in your own health by carefully washing before surgery. Shower the night before and the morning of surgery using the instructions below. If you  CVA tenderness and no right CVA tenderness.   Musculoskeletal: Normal range of motion.         General: No swelling, tenderness or deformity. Normal range of motion.      Cervical back: She exhibits no tenderness.      Right lower leg: No edema.      Left lower leg: No edema.   Lymphadenopathy:     She has no cervical adenopathy.   Neurological: no focal deficit. She is alert and oriented to person, place, and time.   Skin: Skin is warm, dry, not diaphoretic and no rash. Capillary refill takes less than 2 seconds. No erythema   Psychiatric: Her behavior is normal. Mood, judgment and thought content normal.   Nursing note and vitals reviewed.      Assessment:     1. Influenza A    2. Symptoms of upper respiratory infection (URI)    3. Acute cough    4. Wheezing      Results for orders placed or performed in visit on 02/07/24   POCT Strep A, Molecular   Result Value Ref Range    Molecular Strep A, POC Negative Negative     Acceptable Yes    POCT Influenza A/B MOLECULAR   Result Value Ref Range    POC Molecular Influenza A Ag Positive (A) Negative, Not Reported    POC Molecular Influenza B Ag Negative Negative, Not Reported     Acceptable Yes    POCT COVID-19 Rapid Screening   Result Value Ref Range    POC Rapid COVID Negative Negative     Acceptable Yes    Pt appears well, no increased work of breathing, respiratory distress, noted stable vitals with no need for fluid resuscitation at the time of exam.     Plan:   Patient is out of the window for antiviral treatment discussed symptomatic treatment with cough expectorant and suppressant to use as needed drink plenty of fluids dehydrated and seek ER evaluation for any worsening condition or increasing general at distress.    Influenza A  -     promethazine-dextromethorphan (PROMETHAZINE-DM) 6.25-15 mg/5 mL Syrp; Take 10 mLs by mouth every 6 to 8 hours as needed (cough). May cause sedation.  Do not drive or operate heavy  machinery after taking this medication.  Dispense: 120 mL; Refill: 0    Symptoms of upper respiratory infection (URI)  -     POCT Strep A, Molecular  -     POCT Influenza A/B MOLECULAR  -     POCT COVID-19 Rapid Screening  -     benzonatate (TESSALON) 200 MG capsule; Take 1 capsule (200 mg total) by mouth 3 (three) times daily as needed for Cough.  Dispense: 30 capsule; Refill: 0  -     promethazine-dextromethorphan (PROMETHAZINE-DM) 6.25-15 mg/5 mL Syrp; Take 10 mLs by mouth every 6 to 8 hours as needed (cough). May cause sedation.  Do not drive or operate heavy machinery after taking this medication.  Dispense: 120 mL; Refill: 0    Acute cough    Wheezing

## 2025-04-15 ENCOUNTER — OFFICE VISIT (OUTPATIENT)
Dept: OPHTHALMOLOGY | Facility: CLINIC | Age: 60
End: 2025-04-15
Payer: MEDICAID

## 2025-04-15 VITALS — HEIGHT: 62 IN | WEIGHT: 174.38 LBS | BODY MASS INDEX: 32.09 KG/M2

## 2025-04-15 DIAGNOSIS — H47.322 OPTIC DISC DRUSEN, LEFT: ICD-10-CM

## 2025-04-15 DIAGNOSIS — H43.811 PVD (POSTERIOR VITREOUS DETACHMENT), RIGHT EYE: ICD-10-CM

## 2025-04-15 DIAGNOSIS — Z96.1 PSEUDOPHAKIA, BOTH EYES: Primary | ICD-10-CM

## 2025-04-15 DIAGNOSIS — H50.812 DUANE'S SYNDROME OF LEFT EYE: ICD-10-CM

## 2025-04-15 PROCEDURE — 92133 CPTRZD OPH DX IMG PST SGM ON: CPT | Mod: PBBFAC,PN

## 2025-04-15 PROCEDURE — 99213 OFFICE O/P EST LOW 20 MIN: CPT | Mod: PBBFAC,PN

## 2025-04-15 PROCEDURE — 92133 CPTRZD OPH DX IMG PST SGM ON: CPT | Mod: PBBFAC,PN | Performed by: OPHTHALMOLOGY

## 2025-04-21 DIAGNOSIS — E66.01 CLASS 3 SEVERE OBESITY DUE TO EXCESS CALORIES WITH SERIOUS COMORBIDITY AND BODY MASS INDEX (BMI) OF 40.0 TO 44.9 IN ADULT: ICD-10-CM

## 2025-04-21 DIAGNOSIS — E66.813 CLASS 3 SEVERE OBESITY DUE TO EXCESS CALORIES WITH SERIOUS COMORBIDITY AND BODY MASS INDEX (BMI) OF 40.0 TO 44.9 IN ADULT: ICD-10-CM

## 2025-04-22 RX ORDER — CICLOPIROX OLAMINE 7.7 MG/100ML
SUSPENSION TOPICAL
Qty: 60 ML | Refills: 5 | Status: SHIPPED | OUTPATIENT
Start: 2025-04-22

## 2025-04-23 RX ORDER — LOSARTAN POTASSIUM 50 MG/1
50 TABLET ORAL DAILY
Qty: 90 TABLET | Refills: 1 | Status: SHIPPED | OUTPATIENT
Start: 2025-04-23 | End: 2025-10-20

## 2025-05-16 ENCOUNTER — TELEPHONE (OUTPATIENT)
Dept: INTERNAL MEDICINE | Facility: CLINIC | Age: 60
End: 2025-05-16
Payer: MEDICAID

## 2025-05-16 NOTE — TELEPHONE ENCOUNTER
Called patient and date of birth verified. Patient states she was told that she was exposed to mold and after looking it up she has all of the symptoms of mold exposure. She states that urgent care and ED told her to call her PCP because they do not test for mold exposure. Please advise.

## 2025-05-19 NOTE — TELEPHONE ENCOUNTER
Pt following up about message sent to Dr Rodriguez and requesting a call back from Lois regarding her being exposed to mold. Please advise

## 2025-05-20 NOTE — TELEPHONE ENCOUNTER
Called patient and date of birth verified. Notified patient of recommendation from her PCP to go to ED. Patient verbalized understanding.

## 2025-07-03 ENCOUNTER — OFFICE VISIT (OUTPATIENT)
Dept: INTERNAL MEDICINE | Facility: CLINIC | Age: 60
End: 2025-07-03
Payer: MEDICAID

## 2025-07-03 VITALS
DIASTOLIC BLOOD PRESSURE: 70 MMHG | HEART RATE: 83 BPM | BODY MASS INDEX: 33.49 KG/M2 | WEIGHT: 182 LBS | TEMPERATURE: 99 F | HEIGHT: 62 IN | RESPIRATION RATE: 20 BRPM | OXYGEN SATURATION: 97 % | SYSTOLIC BLOOD PRESSURE: 148 MMHG

## 2025-07-03 DIAGNOSIS — E66.813 CLASS 3 SEVERE OBESITY DUE TO EXCESS CALORIES WITH SERIOUS COMORBIDITY AND BODY MASS INDEX (BMI) OF 40.0 TO 44.9 IN ADULT: ICD-10-CM

## 2025-07-03 DIAGNOSIS — Z00.00 HEALTH CARE MAINTENANCE: ICD-10-CM

## 2025-07-03 DIAGNOSIS — H47.329 DRUSEN OF OPTIC DISC, UNSPECIFIED LATERALITY: ICD-10-CM

## 2025-07-03 DIAGNOSIS — E66.01 CLASS 3 SEVERE OBESITY DUE TO EXCESS CALORIES WITH SERIOUS COMORBIDITY AND BODY MASS INDEX (BMI) OF 40.0 TO 44.9 IN ADULT: ICD-10-CM

## 2025-07-03 DIAGNOSIS — I10 PRIMARY HYPERTENSION: Primary | ICD-10-CM

## 2025-07-03 DIAGNOSIS — L63.9 ALOPECIA AREATA: ICD-10-CM

## 2025-07-03 PROCEDURE — 99215 OFFICE O/P EST HI 40 MIN: CPT | Mod: PBBFAC

## 2025-07-03 RX ORDER — BUPROPION HYDROCHLORIDE 150 MG/1
150 TABLET ORAL DAILY
Qty: 30 TABLET | Refills: 5 | Status: SHIPPED | OUTPATIENT
Start: 2025-07-03 | End: 2025-12-30

## 2025-07-03 RX ORDER — LOSARTAN POTASSIUM 50 MG/1
50 TABLET ORAL DAILY
Qty: 90 TABLET | Refills: 1 | Status: SHIPPED | OUTPATIENT
Start: 2025-07-03 | End: 2025-12-30

## 2025-07-03 NOTE — PROGRESS NOTES
[facility] INTERNAL MEDICINE CLINIC NOTE    Patient Name: Keri Sahni  YOB: 1965   MRN: [id number]  Appointment Date: 7/3/2025  AppointmentTime: 02:55 PM    Subjective     HPI    Keri Sahni is a 60 y.o.  female with PMH positive for obesity, allergies, alopecia areata, abnormal uterine bleeding s/p supracervical hysterectomy with BSO, anxiety, depression, who presented to IM clinic today for follow up visit. Patient has gained 8 pounds on interval. States her diet as poor for past 2 months. Patient complains of nightly wheezing, mucous production she thinks could be 2/2 hx of old mold exposure. States she has symptoms of indigestion. Cutaneous symptoms improving but still reporting hair loss. Patient states recently switched to topical tacrolimus. No other complaints at this time.     Interval History  Followed by optho, report encouraging. Derm symptoms improving     Past Medical History:  Past Medical History:   Diagnosis Date    Back injury     Benign essential HTN     Depression     Insomnia     Knee pain     Obesity     Pregnancy     Sinusitis      Past Surgical History:  Past Surgical History:   Procedure Laterality Date    APPENDECTOMY      CATARACT EXTRACTION Left 04/01/2021    CATARACT EXTRACTION Right 11/19/2020    COLONOSCOPY, WITH POLYPECTOMY USING SNARE N/A 10/6/2023    Procedure: COLONOSCOPY, WITH POLYPECTOMY USING SNARE;  Surgeon: Juan Vyas MD;  Location: [facility] ENDOSCOPY;  Service: General;  Laterality: N/A;  hot    HEEL SPUR SURGERY      HYSTERECTOMY      KNEE SURGERY      NECK SURGERY      TONSILLECTOMY       Family History:  Family History   Problem Relation Name Age of Onset    Diabetes Mother      Hypertension Mother      Diabetes Father      Cataracts Father      Glaucoma Father      Hypertension Father      Cataracts Sister      Glaucoma Sister      Hypertension Sister      Stroke Sister      Thyroid disease Sister      No Known Problems Brother       No Known Problems Maternal Aunt      Prostate cancer Maternal Uncle      No Known Problems Paternal Aunt      No Known Problems Paternal Uncle      Blindness Maternal Grandmother      Glaucoma Maternal Grandmother      No Known Problems Maternal Grandfather      No Known Problems Paternal Grandmother      No Known Problems Paternal Grandfather      Stroke Cousin      Amblyopia Neg Hx      Macular degeneration Neg Hx      Retinal detachment Neg Hx      Strabismus Neg Hx       Social History:  Social History     Tobacco Use    Smoking status: Former     Current packs/day: 0.00     Types: Cigarettes     Quit date: 10/1/2016     Years since quittin.7     Passive exposure: Past    Smokeless tobacco: Former     Quit date: 10/1/2016   Substance Use Topics    Alcohol use: Not Currently    Drug use: Never     Allergies:  Review of patient's allergies indicates:  No Known Allergies  Home Medications:  Prior to Admission medications    Medication Sig Start Date End Date Taking? Authorizing Provider   albuterol (PROVENTIL/VENTOLIN HFA) 90 mcg/actuation inhaler See Instructions, INHALE 2 PUFFS BY MOUTH EVERY 6 HOURS AS NEEDED FOR WHEEZING, # 2 EA, 6 Refill(s), Pharmacy: [id number], 159, cm, Height/Length Dosing, 21 14:36:00 CDT, 90.5, kg, Weight Dosing, 21 14:36:00 CDT 23   Will Rodriguez MD   amLODIPine (NORVASC) 5 MG tablet Take 5 mg by mouth. 23   Provider, Historical   baclofen (LIORESAL) 10 MG tablet Take 10 mg by mouth 2 (two) times daily. 23   Provider, Historical   betamethasone dipropionate 0.05 % cream Apply topically 2 (two) times daily.    Provider, Historical   busPIRone (BUSPAR) 5 MG Tab Take 5 mg by mouth 3 (three) times daily. 23   Provider, Historical   celecoxib (CELEBREX) 200 MG capsule Take 200 mg by mouth. 23   Provider, Historical   DULoxetine (CYMBALTA) 60 MG capsule Take 120 mg by mouth. 23   Provider, Historical   fluocinonide (LIDEX) 0.05 %  external solution Apply topically 2 (two) times daily as needed (Itching). 9/29/23   Trisha Smalls MD   fluticasone propionate (FLONASE) 50 mcg/actuation nasal spray 1 spray (50 mcg total) by Each Nostril route once daily. 8/29/23   Will Rodriguez MD   gabapentin (NEURONTIN) 400 MG capsule Take 400 mg by mouth 3 (three) times daily. 8/22/23   Provider, Historical   guaiFENesin (MUCINEX) 600 mg 12 hr tablet Take 600 mg by mouth. 8/4/21   Provider, Historical   HYDROcodone-acetaminophen (NORCO)  mg per tablet Take by mouth. 8/22/23   Provider, Historical   ketoconazole (NIZORAL) 2 % cream Apply topically once daily. Apply for 4 weeks only 9/29/23   Trisha Smalls MD   ketoconazole (NIZORAL) 2 % shampoo Apply topically twice a week. 10/2/23   Trisha Smalls MD   loratadine (CLARITIN) 10 mg tablet Take 1 tablet (10 mg total) by mouth once daily. 1/17/23   Trisha Smalls MD   losartan (COZAAR) 25 MG tablet Take 1 tablet (25 mg total) by mouth once daily. 1/17/23 1/17/24  Tirsha Smalls MD   polyethylene glycol (MOVIPREP) 100-7.5-2.691 gram solution Take as directed per instructions provided by GI Clinic  Patient not taking: Reported on 10/19/2023 9/8/23   Francesca Rg FNP   promethazine-dextromethorphan (PROMETHAZINE-DM) 6.25-15 mg/5 mL Syrp Take 5 mLs by mouth every 6 to 8 hours as needed (cough). May cause sedation.  Do not drive or operate heavy machinery.  Patient not taking: Reported on 9/27/2023 6/12/23   Omar Marsh MD   sulfamethoxazole-trimethoprim 800-160mg (BACTRIM DS) 800-160 mg Tab Take 1 tablet by mouth 2 (two) times daily. for 7 days 10/19/23 10/26/23  Deb Levine FNP   traZODone (DESYREL) 100 MG tablet Take 100 mg by mouth every evening. 8/30/23   Provider, Historical     Review of Systems:  Review of Systems   All other systems reviewed and are negative.      Objective     Vitals:   Vitals:    07/03/25 1539   BP: (!) 150/88   Pulse: 83   Resp: 20   Temp: 98.7 °F (37.1  °C)       Physical Examination:   Physical Exam  Vitals reviewed.   Constitutional:       General: She is not in acute distress.     Appearance: Normal appearance. She is obese. She is not ill-appearing, toxic-appearing or diaphoretic.   HENT:      Head: Normocephalic and atraumatic.      Right Ear: External ear normal.      Left Ear: External ear normal.   Eyes:      General: No scleral icterus.        Right eye: No discharge.         Left eye: No discharge.      Extraocular Movements: Extraocular movements intact.      Conjunctiva/sclera: Conjunctivae normal.      Pupils: Pupils are equal, round, and reactive to light.   Cardiovascular:      Rate and Rhythm: Normal rate and regular rhythm.      Pulses: Normal pulses.      Heart sounds: Normal heart sounds. No murmur heard.     No friction rub. No gallop.   Pulmonary:      Effort: Pulmonary effort is normal. No respiratory distress.      Breath sounds: Normal breath sounds. No wheezing, rhonchi or rales.   Abdominal:      General: Abdomen is flat. Bowel sounds are normal. There is no distension.      Palpations: Abdomen is soft.      Tenderness: There is no abdominal tenderness. There is no guarding.   Musculoskeletal:         General: No swelling, tenderness, deformity or signs of injury. Normal range of motion.      Left lower leg: No edema.   Skin:     General: Skin is warm and dry.      Capillary Refill: Capillary refill takes less than 2 seconds.      Coloration: Skin is not jaundiced.      Findings: No bruising, erythema or rash.   Neurological:      Mental Status: She is alert.      Comments: AAO to person, place, time, and situation; CN II-XII grossly intact       PREVENTIVE CARE:  Lab Work:    DM (age>45 or HTN):  Lab Results   Component Value Date    HGBA1C 5.2 10/11/2024    ZZTKYRF5K 5.9 10/24/2023     Lipid :  Lab Results   Component Value Date    CHOL 189 10/11/2024    TRIG 107 10/11/2024    HDL 48 10/11/2024    .00 10/11/2024     Thyroid:  Lab  "Results   Component Value Date    TSH 4.432 10/11/2024    T4 8.85 03/24/2022     Screening:    Mammo:  Mammogram (12/14/2023) showed no suspicious masses, asymmetry, distortion, or calcifications; we will repeat in 2025  DEXA (age>65 or FRAX > 9.3%): Not indicated at this time due to age and/or FRAX score  Lung Ca (30PY smoker age 55-79 or quit in last 15y): Not indicated as patient does not meet 20 pack year smoking history per patient description of smoking habits  Colon Ca: (age 45-75-annual FOBT, 5y flex + FOBTq3 or 10y c-scope): Colonoscopy (10/06/2023) showed multiple polyps with subsequent polypectomy; will repeat colonoscopy in 2028  AAA (smoker 65-76): Not indicated due to assigned sex    ID Titers and Vaccinations:    Immunization History   Administered Date(s) Administered    COVID-19, MRNA, LN-S, PF (Pfizer) (Gray Cap) 02/12/2022    COVID-19, MRNA, LN-S, PF (Pfizer) (Purple Cap) 07/15/2021, 08/05/2021    Hepatitis B, Adult 07/11/2013, 08/14/2013    Influenza - Trivalent - Fluarix, Flulaval, Fluzone, Afluria - PF 04/01/2024    Zoster 01/13/2024    Zoster Recombinant 04/01/2024      HIV (once age 15-65):  No results found for: "HIV1X2", "BRW53PDRG"     Hepatitis Screening: No results found for: "HAV", "HEPAIGM", "HEPBIGM", "HEPBCAB", "HBEAG", "HEPCAB"   Pneumococcal vaccine (65 and over or high risk): Not indicated at this time due to age  Influenza Vaccine: UTD  Tetanus: UTD  Zoster vaccination (> 50y.o): UTD  Covid vaccine: Received 4 doses of Pfizer  PAP: Pap smear (10/19/2023) NIL, HPV negative; hx of abnormal pap smear    The 10-year ASCVD risk score (Oksana IVAN, et al., 2019) is: 11.7%    Values used to calculate the score:      Age: 60 years      Sex: Female      Is Non- : Yes      Diabetic: No      Tobacco smoker: No      Systolic Blood Pressure: 150 mmHg      Is BP treated: Yes      HDL Cholesterol: 48 mg/dL      Total Cholesterol: 189 " mg/dL      ASSESSMENT/PLAN:    Obesity  BMI 31.90  Hemoglobin A1c 5.5  -patient has lost fifteen pounds since this year, recently gained 8 pounds back   -educated and discussed dietary changes and exercise regimen  -patient would like to avoid weight loss medication at this time  -patient was referred to and evaluated by physical therapy who discharged without intervention as patient met appropriate milestones per patient report  -follows with [facility] for continued assistance with lifestyle modifications    Chronic neck pain  Chronic back pain  Chronic knee pain  -pain 2/2 work place fall in past, improved on interval   -see mri cervical spine without contrast (12/29/2022)   -follows with orthopedics  -follows with pain management specialist  -on baclofen 10 mg bid, celecoxib 200 mg qd, gabapentin 400 mg tid, duloxetine 60 mg q.d. and tylenol #4 by pain management. Pain well controlled at this time   -continue management per pain management and Orthopedics    Hypertension  /70  -goal bp < 130/80  -bp within goal  -amlodipine discontinued due to lower extremity edema; lower extremity swelling has since resolved  -lisinopril discontinued due to chronic cough  -reporting medication compliance, patient states she has been drinking caffeine   -recommend holding caffeine, monitoring and logging BP at home. Patient is leaving the state tomorrow and not able to attend BP nurse check or log check    -continue losartan 50 mg q.d.    Duane's syndrome OS, type I  Optic disc drusen OS  -fundus exam normal  -B scan (03/2021) showed drusen OS  -MRI and MRV (2012) wnl  -HVF wnl in past  -continues to follow with [facility] ophthalmology  -continue management per ophthalmology    Anxiety  Depression  -follows with outpatient psychiatrist but states her provider is retiring soon   -on BuSpar 5 mg qd, wellbutrin 150 mg daily   -continue management per Psychiatry     Alopecia areata  -follows with [facility]  -receiving  intradermal kenalog injections by [facility]  -continue management per dermatology    Seasonal allergies  -patient reports seasonal pattern of symptoms  -continue Flonase and loratadine 10 mg prn symptoms    Health Maintenance:  -repeat labs today or earliest convenience   -initiated and/or refilled medications as above  -screenings as above  -vaccinations as above    Follow-up in 6 months    Future Appointments   Date Time Provider Department Center   9/12/2025  8:15 AM Evan Casillas MD [facility]    10/28/2025  1:40 PM Deb Levine FNP [facility]    4/16/2026  2:00 PM PROVIDERS, JOHAN Rios DO  [facility] Internal Medicine [facility]

## 2025-07-07 ENCOUNTER — TELEPHONE (OUTPATIENT)
Dept: INTERNAL MEDICINE | Facility: CLINIC | Age: 60
End: 2025-07-07
Payer: MEDICAID

## 2025-07-07 DIAGNOSIS — K21.9 GASTROESOPHAGEAL REFLUX DISEASE, UNSPECIFIED WHETHER ESOPHAGITIS PRESENT: Primary | ICD-10-CM

## 2025-07-07 NOTE — TELEPHONE ENCOUNTER
Contacted patient ID and  verified. Patient stated during last clinic visit she voiced concerns about GERD issues and provider stated he would send a prescription for Pantoprazole 40mg. Patient stated prescription was not sent to her pharmacy and requested for Dr. Rios to oblige to  her request as discussed. Please review and advise

## 2025-07-07 NOTE — TELEPHONE ENCOUNTER
Pt called requesting a call back regarding Rx's she states she was prescribed at her visit last wk. Pt can be reached at [phone]

## 2025-07-11 ENCOUNTER — OFFICE VISIT (OUTPATIENT)
Dept: DERMATOLOGY | Facility: CLINIC | Age: 60
End: 2025-07-11
Payer: MEDICAID

## 2025-07-11 VITALS
HEIGHT: 62 IN | RESPIRATION RATE: 16 BRPM | DIASTOLIC BLOOD PRESSURE: 72 MMHG | HEART RATE: 68 BPM | BODY MASS INDEX: 33.56 KG/M2 | OXYGEN SATURATION: 98 % | WEIGHT: 182.38 LBS | SYSTOLIC BLOOD PRESSURE: 126 MMHG | TEMPERATURE: 98 F

## 2025-07-11 DIAGNOSIS — L66.12 FRONTAL FIBROSING ALOPECIA: Primary | ICD-10-CM

## 2025-07-11 DIAGNOSIS — L65.9 ALOPECIA: ICD-10-CM

## 2025-07-11 PROCEDURE — 99214 OFFICE O/P EST MOD 30 MIN: CPT | Mod: PBBFAC | Performed by: DERMATOLOGY

## 2025-07-11 RX ORDER — TACROLIMUS 1 MG/G
OINTMENT TOPICAL 2 TIMES DAILY
Qty: 60 G | Refills: 5 | Status: SHIPPED | OUTPATIENT
Start: 2025-07-11

## 2025-07-11 RX ORDER — BETAMETHASONE DIPROPIONATE 0.5 MG/G
CREAM TOPICAL 2 TIMES DAILY
Qty: 45 G | Refills: 2 | Status: SHIPPED | OUTPATIENT
Start: 2025-07-11

## 2025-07-11 NOTE — PROGRESS NOTES
Patient seen and examined with resident, agree with plan as outlined. Discussed ILK which pt defers, states happy with status/hair regrowing/states she's under less stress      Evan Casillas MD  [facility] - Dermatology

## 2025-07-11 NOTE — PROGRESS NOTES
"[facility] DERMATOLOGY OFFICE VISIT NOTE  Keri Sahni  [id number]  07/11/2025      Chief Complaint: Alopecia      HPI:    Keri Sahni is a 60 y.o. female  presenting to [facility] alopecia.    Last visit:  10/11/2024    Patient reports that she has been doing well.  She is currently on tacrolimus ointment which she uses during the week, betamethasone cream which she uses on the weekend.  She has noted stability in her hair loss, reports that she may have had a little hair growth, but definitely no further loss.  She recently moved from her old apartment, she reports that there was mold in her apartment and that this even contributing to some symptoms.  States that since moving out 1 month ago she has been feeling much better and believes her hairs doing better.  At last visit she was prescribed Loprox for tinea pedis at in his had complete improvement.      ROS:  Gen: denies fever or chills  Cardio: denies chest pain  Resp: denies shortness of breath  Skin: as per HPI    PE:  Vitals:    07/11/25 0847   BP: 126/72   Pulse: 68   Resp: 16   Temp: 98.3 °F (36.8 °C)   TempSrc: Oral   SpO2: 98%   Weight: 82.7 kg (182 lb 6.4 oz)   Height: 5' 2" (1.575 m)      Gen: alert, no acute distress  Skin: alopecia of central scalp and hairline with scarring.   Psych: cooperative, appropriate mood and affect      Assessment:   1. Frontal fibrosing alopecia    2. Alopecia          Plan:  Improvement in hair density, no further hair loss.  Continue to use tacrolimus ointment to scalp on week days and betamethasone ointment on weekends to limit possible skin thinning effects of chronic topical steroid use.   Refills provided.    Return to clinic in 6 months    Sam Granda MD  [facility] Internal Medicine PGY-3        "

## 2025-07-12 RX ORDER — PANTOPRAZOLE SODIUM 40 MG/1
40 TABLET, DELAYED RELEASE ORAL DAILY
Qty: 56 TABLET | Refills: 0 | Status: SHIPPED | OUTPATIENT
Start: 2025-07-12 | End: 2025-09-06

## 2025-07-15 ENCOUNTER — TELEPHONE (OUTPATIENT)
Dept: OPHTHALMOLOGY | Facility: CLINIC | Age: 60
End: 2025-07-15
Payer: MEDICAID

## 2025-07-15 NOTE — TELEPHONE ENCOUNTER
----- Message from Yunier sent at 7/15/2025  7:59 AM CDT -----  Patient called wanting to speak with a nurse or doctor about her eye glass prescription. She is wanting to know if her vision has gotten better or worse. She stats she forgot to ask about it her last visit in April/2025    07/15/2025 8:38 AM  Patient was called and left a message stating that her prescription has not changed much from her current glasses. A copy of her Rx was left with the front staff if she would like to  a copy.

## 2025-08-06 ENCOUNTER — TELEPHONE (OUTPATIENT)
Dept: INTERNAL MEDICINE | Facility: CLINIC | Age: 60
End: 2025-08-06
Payer: MEDICAID

## 2025-08-06 DIAGNOSIS — R71.8 MICROCYTOSIS: Primary | ICD-10-CM

## 2025-08-06 DIAGNOSIS — Z11.3 ROUTINE SCREENING FOR STI (SEXUALLY TRANSMITTED INFECTION): ICD-10-CM

## 2025-08-06 DIAGNOSIS — I10 PRIMARY HYPERTENSION: ICD-10-CM

## 2025-08-06 NOTE — TELEPHONE ENCOUNTER
Pt called requesting medication for edema (fluid pills per patient) for her swollen ankles. Pt states she's working on losing weight but is not where Dr Rodriguez wants her be at right now. Please advise

## 2025-08-26 ENCOUNTER — LAB VISIT (OUTPATIENT)
Dept: LAB | Facility: HOSPITAL | Age: 60
End: 2025-08-26
Payer: MEDICAID

## 2025-08-26 DIAGNOSIS — R71.8 MICROCYTOSIS: ICD-10-CM

## 2025-08-26 DIAGNOSIS — I10 PRIMARY HYPERTENSION: ICD-10-CM

## 2025-08-26 DIAGNOSIS — Z11.3 ROUTINE SCREENING FOR STI (SEXUALLY TRANSMITTED INFECTION): ICD-10-CM

## 2025-08-26 LAB
ALBUMIN SERPL-MCNC: 4.1 G/DL (ref 3.4–4.8)
ALBUMIN/CREAT UR: 10.4 MG/GM CR (ref 0–30)
ALBUMIN/GLOB SERPL: 1.2 RATIO (ref 1.1–2)
ALP SERPL-CCNC: 86 UNIT/L (ref 40–150)
ALT SERPL-CCNC: 19 UNIT/L (ref 0–55)
ANION GAP SERPL CALC-SCNC: 4 MEQ/L
AST SERPL-CCNC: 17 UNIT/L (ref 11–45)
BACTERIA #/AREA URNS AUTO: ABNORMAL /HPF
BASOPHILS # BLD AUTO: 0.03 X10(3)/MCL
BASOPHILS NFR BLD AUTO: 0.4 %
BILIRUB SERPL-MCNC: 0.3 MG/DL
BILIRUB UR QL STRIP.AUTO: NEGATIVE
BUN SERPL-MCNC: 14.5 MG/DL (ref 9.8–20.1)
CALCIUM SERPL-MCNC: 9.7 MG/DL (ref 8.4–10.2)
CHLORIDE SERPL-SCNC: 109 MMOL/L (ref 98–107)
CHOLEST SERPL-MCNC: 196 MG/DL
CHOLEST/HDLC SERPL: 4 {RATIO} (ref 0–5)
CLARITY UR: CLEAR
CO2 SERPL-SCNC: 29 MMOL/L (ref 23–31)
COLOR UR AUTO: ABNORMAL
CREAT SERPL-MCNC: 0.68 MG/DL (ref 0.55–1.02)
CREAT UR-MCNC: 67.6 MG/DL (ref 45–106)
CREAT UR-MCNC: 70.5 MG/DL (ref 45–106)
CREAT/UREA NIT SERPL: 21
EOSINOPHIL # BLD AUTO: 0.13 X10(3)/MCL (ref 0–0.9)
EOSINOPHIL NFR BLD AUTO: 1.6 %
ERYTHROCYTE [DISTWIDTH] IN BLOOD BY AUTOMATED COUNT: 14.1 % (ref 11.5–17)
EST. AVERAGE GLUCOSE BLD GHB EST-MCNC: 105.4 MG/DL
FERRITIN SERPL-MCNC: 254.15 NG/ML (ref 4.63–204)
GFR SERPLBLD CREATININE-BSD FMLA CKD-EPI: >60 ML/MIN/1.73/M2
GLOBULIN SER-MCNC: 3.5 GM/DL (ref 2.4–3.5)
GLUCOSE SERPL-MCNC: 81 MG/DL (ref 82–115)
GLUCOSE UR QL STRIP: NORMAL
HBA1C MFR BLD: 5.3 %
HCT VFR BLD AUTO: 38 % (ref 37–47)
HCV AB SERPL QL IA: NONREACTIVE
HDLC SERPL-MCNC: 49 MG/DL (ref 35–60)
HGB BLD-MCNC: 13.3 G/DL (ref 12–16)
HGB UR QL STRIP: NEGATIVE
HIV 1+2 AB+HIV1 P24 AG SERPL QL IA: NONREACTIVE
HYALINE CASTS #/AREA URNS LPF: ABNORMAL /LPF
IMM GRANULOCYTES # BLD AUTO: 0.02 X10(3)/MCL (ref 0–0.04)
IMM GRANULOCYTES NFR BLD AUTO: 0.2 %
IRON SATN MFR SERPL: 43 % (ref 20–50)
IRON SERPL-MCNC: 125 UG/DL (ref 50–170)
KETONES UR QL STRIP: NEGATIVE
LDLC SERPL CALC-MCNC: 109 MG/DL (ref 50–140)
LEUKOCYTE ESTERASE UR QL STRIP: NEGATIVE
LYMPHOCYTES # BLD AUTO: 2.88 X10(3)/MCL (ref 0.6–4.6)
LYMPHOCYTES NFR BLD AUTO: 35.3 %
MCH RBC QN AUTO: 28.7 PG (ref 27–31)
MCHC RBC AUTO-ENTMCNC: 35 G/DL (ref 33–36)
MCV RBC AUTO: 82.1 FL (ref 80–94)
MICROALBUMIN UR-MCNC: 7 UG/ML
MONOCYTES # BLD AUTO: 0.57 X10(3)/MCL (ref 0.1–1.3)
MONOCYTES NFR BLD AUTO: 7 %
MUCOUS THREADS URNS QL MICRO: ABNORMAL /LPF
NEUTROPHILS # BLD AUTO: 4.53 X10(3)/MCL (ref 2.1–9.2)
NEUTROPHILS NFR BLD AUTO: 55.5 %
NITRITE UR QL STRIP: NEGATIVE
NRBC BLD AUTO-RTO: 0 %
PH UR STRIP: 7 [PH]
PLATELET # BLD AUTO: 201 X10(3)/MCL (ref 130–400)
PMV BLD AUTO: 10.9 FL (ref 7.4–10.4)
POTASSIUM SERPL-SCNC: 4.4 MMOL/L (ref 3.5–5.1)
PROT SERPL-MCNC: 7.6 GM/DL (ref 5.8–7.6)
PROT UR QL STRIP: NEGATIVE
PROT UR STRIP-MCNC: <6.8 MG/DL
RBC # BLD AUTO: 4.63 X10(6)/MCL (ref 4.2–5.4)
RBC #/AREA URNS AUTO: ABNORMAL /HPF
SODIUM SERPL-SCNC: 142 MMOL/L (ref 136–145)
SP GR UR STRIP.AUTO: 1.02 (ref 1–1.03)
SQUAMOUS #/AREA URNS LPF: ABNORMAL /HPF
TIBC SERPL-MCNC: 166 UG/DL (ref 70–310)
TIBC SERPL-MCNC: 291 UG/DL (ref 250–450)
TRANSFERRIN SERPL-MCNC: 246 MG/DL (ref 173–360)
TRIGL SERPL-MCNC: 191 MG/DL (ref 37–140)
UROBILINOGEN UR STRIP-ACNC: NORMAL
VLDLC SERPL CALC-MCNC: 38 MG/DL
WBC # BLD AUTO: 8.16 X10(3)/MCL (ref 4.5–11.5)
WBC #/AREA URNS AUTO: ABNORMAL /HPF

## 2025-08-26 PROCEDURE — 83550 IRON BINDING TEST: CPT

## 2025-08-26 PROCEDURE — 86803 HEPATITIS C AB TEST: CPT

## 2025-08-26 PROCEDURE — 82043 UR ALBUMIN QUANTITATIVE: CPT

## 2025-08-26 PROCEDURE — 80053 COMPREHEN METABOLIC PANEL: CPT

## 2025-08-26 PROCEDURE — 36415 COLL VENOUS BLD VENIPUNCTURE: CPT

## 2025-08-26 PROCEDURE — 80061 LIPID PANEL: CPT

## 2025-08-26 PROCEDURE — 84156 ASSAY OF PROTEIN URINE: CPT

## 2025-08-26 PROCEDURE — 87389 HIV-1 AG W/HIV-1&-2 AB AG IA: CPT

## 2025-08-26 PROCEDURE — 81001 URINALYSIS AUTO W/SCOPE: CPT

## 2025-08-26 PROCEDURE — 83036 HEMOGLOBIN GLYCOSYLATED A1C: CPT

## 2025-08-26 PROCEDURE — 82728 ASSAY OF FERRITIN: CPT

## 2025-08-26 PROCEDURE — 85025 COMPLETE CBC W/AUTO DIFF WBC: CPT

## 2025-08-27 ENCOUNTER — OFFICE VISIT (OUTPATIENT)
Dept: URGENT CARE | Facility: CLINIC | Age: 60
End: 2025-08-27
Payer: MEDICAID

## 2025-08-27 VITALS
TEMPERATURE: 99 F | BODY MASS INDEX: 34.23 KG/M2 | HEART RATE: 73 BPM | SYSTOLIC BLOOD PRESSURE: 143 MMHG | WEIGHT: 186 LBS | RESPIRATION RATE: 18 BRPM | OXYGEN SATURATION: 98 % | HEIGHT: 62 IN | DIASTOLIC BLOOD PRESSURE: 82 MMHG

## 2025-08-27 DIAGNOSIS — J06.9 UPPER RESPIRATORY TRACT INFECTION, UNSPECIFIED TYPE: Primary | ICD-10-CM

## 2025-08-27 DIAGNOSIS — R60.0 EDEMA, LOWER EXTREMITY: ICD-10-CM

## 2025-08-27 PROCEDURE — 99215 OFFICE O/P EST HI 40 MIN: CPT | Mod: PBBFAC

## 2025-08-27 PROCEDURE — 99214 OFFICE O/P EST MOD 30 MIN: CPT | Mod: S$PBB,,,

## 2025-08-27 PROCEDURE — 63600175 PHARM REV CODE 636 W HCPCS

## 2025-08-27 RX ORDER — HYDROCHLOROTHIAZIDE 12.5 MG/1
12.5 TABLET ORAL DAILY
Qty: 30 TABLET | Refills: 0 | Status: SHIPPED | OUTPATIENT
Start: 2025-08-27 | End: 2025-09-26

## 2025-08-27 RX ORDER — DEXAMETHASONE SODIUM PHOSPHATE 4 MG/ML
4 INJECTION, SOLUTION INTRA-ARTICULAR; INTRALESIONAL; INTRAMUSCULAR; INTRAVENOUS; SOFT TISSUE
Status: COMPLETED | OUTPATIENT
Start: 2025-08-27 | End: 2025-08-27

## 2025-08-27 RX ORDER — DEXBROMPHENIRAMINE MALEATE, PHENYLEPHRINE HYDROCHLORIDE 2; 7.5 MG/1; MG/1
2-7.5 TABLET ORAL 4 TIMES DAILY PRN
Qty: 28 TABLET | Refills: 0 | Status: SHIPPED | OUTPATIENT
Start: 2025-08-27 | End: 2025-09-03

## 2025-08-27 RX ORDER — PROMETHAZINE HYDROCHLORIDE AND DEXTROMETHORPHAN HYDROBROMIDE 6.25; 15 MG/5ML; MG/5ML
5 SYRUP ORAL EVERY 4 HOURS PRN
Qty: 118 ML | Refills: 0 | Status: SHIPPED | OUTPATIENT
Start: 2025-08-27 | End: 2025-09-06

## 2025-08-27 RX ADMIN — DEXAMETHASONE SODIUM PHOSPHATE 4 MG: 4 INJECTION, SOLUTION INTRA-ARTICULAR; INTRALESIONAL; INTRAMUSCULAR; INTRAVENOUS; SOFT TISSUE at 08:08

## (undated) DEVICE — SOL IRRI STRL WATER 1000ML

## (undated) DEVICE — KIT SURGICAL COLON .25 1.1OZ

## (undated) DEVICE — TRAP ETRAP POLYP 50 TRAY

## (undated) DEVICE — MANIFOLD 4 PORT

## (undated) DEVICE — ELECTRODE PATIENT RETURN DISP

## (undated) DEVICE — Device